# Patient Record
Sex: FEMALE | Race: BLACK OR AFRICAN AMERICAN | NOT HISPANIC OR LATINO | Employment: UNEMPLOYED | URBAN - METROPOLITAN AREA
[De-identification: names, ages, dates, MRNs, and addresses within clinical notes are randomized per-mention and may not be internally consistent; named-entity substitution may affect disease eponyms.]

---

## 2023-12-28 ENCOUNTER — ANESTHESIA (OUTPATIENT)
Dept: SURGERY | Facility: HOSPITAL | Age: 23
DRG: 958 | End: 2023-12-28
Payer: COMMERCIAL

## 2023-12-28 ENCOUNTER — ANESTHESIA EVENT (OUTPATIENT)
Dept: SURGERY | Facility: HOSPITAL | Age: 23
DRG: 958 | End: 2023-12-28
Payer: MEDICAID

## 2023-12-28 ENCOUNTER — ANESTHESIA EVENT (OUTPATIENT)
Dept: SURGERY | Facility: HOSPITAL | Age: 23
DRG: 958 | End: 2023-12-28
Payer: COMMERCIAL

## 2023-12-28 ENCOUNTER — HOSPITAL ENCOUNTER (INPATIENT)
Facility: HOSPITAL | Age: 23
LOS: 8 days | Discharge: NURSING, REHAB OR LONG-TERM CARE | DRG: 958 | End: 2024-01-05
Attending: EMERGENCY MEDICINE | Admitting: SURGERY
Payer: MEDICAID

## 2023-12-28 DIAGNOSIS — T14.8XXA HEMATOMA: ICD-10-CM

## 2023-12-28 DIAGNOSIS — S32.810A MULTIPLE CLOSED FRACTURES OF PELVIS WITH DISRUPTION OF PELVIC RING, INITIAL ENCOUNTER: ICD-10-CM

## 2023-12-28 DIAGNOSIS — S82.201B TYPE I OR II OPEN FRACTURE OF RIGHT TIBIA AND FIBULA, INITIAL ENCOUNTER: ICD-10-CM

## 2023-12-28 DIAGNOSIS — S22.41XA CLOSED TRAUMATIC DISPLACED FRACTURE OF MULTIPLE RIBS OF RIGHT SIDE: Primary | ICD-10-CM

## 2023-12-28 DIAGNOSIS — S32.591A CLOSED BILATERAL FRACTURE OF PUBIC RAMI, INITIAL ENCOUNTER: ICD-10-CM

## 2023-12-28 DIAGNOSIS — S82.401B TYPE I OR II OPEN FRACTURE OF RIGHT TIBIA AND FIBULA, INITIAL ENCOUNTER: ICD-10-CM

## 2023-12-28 DIAGNOSIS — S31.821A: ICD-10-CM

## 2023-12-28 DIAGNOSIS — M25.572 ACUTE LEFT ANKLE PAIN: ICD-10-CM
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
## 2023-12-28 DIAGNOSIS — S22.008A CLOSED FRACTURE OF SPINOUS PROCESS OF THORACIC VERTEBRA, INITIAL ENCOUNTER: ICD-10-CM

## 2023-12-28 DIAGNOSIS — R06.02 SOB (SHORTNESS OF BREATH): ICD-10-CM

## 2023-12-28 DIAGNOSIS — S27.321A CONTUSION OF RIGHT LUNG, INITIAL ENCOUNTER: ICD-10-CM

## 2023-12-28 DIAGNOSIS — N94.89 PELVIC HEMATOMA IN FEMALE: ICD-10-CM

## 2023-12-28 DIAGNOSIS — S32.592A CLOSED BILATERAL FRACTURE OF PUBIC RAMI, INITIAL ENCOUNTER: ICD-10-CM

## 2023-12-28 DIAGNOSIS — S32.009A CLOSED FRACTURE OF SPINOUS PROCESS OF LUMBAR VERTEBRA, INITIAL ENCOUNTER: ICD-10-CM

## 2023-12-28 DIAGNOSIS — M79.89 LEG SWELLING: ICD-10-CM

## 2023-12-28 DIAGNOSIS — S82.401B OPEN FRACTURE OF RIGHT TIBIA AND FIBULA: ICD-10-CM

## 2023-12-28 DIAGNOSIS — S32.401A CLOSED NONDISPLACED FRACTURE OF RIGHT ACETABULUM, UNSPECIFIED PORTION OF ACETABULUM, INITIAL ENCOUNTER: ICD-10-CM

## 2023-12-28 DIAGNOSIS — S82.201B OPEN FRACTURE OF RIGHT TIBIA AND FIBULA: ICD-10-CM

## 2023-12-28 DIAGNOSIS — S32.009A CLOSED FRACTURE OF TRANSVERSE PROCESS OF LUMBAR VERTEBRA, INITIAL ENCOUNTER: ICD-10-CM

## 2023-12-28 DIAGNOSIS — R00.0 TACHYCARDIA WITH HEART RATE 121-140 BEATS PER MINUTE: ICD-10-CM

## 2023-12-28 LAB
ABORH RETYPE: NORMAL
ABS NEUT (OLG): 10.04 X10(3)/MCL (ref 2.1–9.2)
ABS NEUT (OLG): 8.14 X10(3)/MCL (ref 2.1–9.2)
ABS NEUT (OLG): 8.24 X10(3)/MCL (ref 2.1–9.2)
ALBUMIN SERPL-MCNC: 3.6 G/DL (ref 3.5–5)
ALBUMIN/GLOB SERPL: 1.5 RATIO (ref 1.1–2)
ALP SERPL-CCNC: 56 UNIT/L (ref 40–150)
ALT SERPL-CCNC: 63 UNIT/L (ref 0–55)
ANISOCYTOSIS BLD QL SMEAR: ABNORMAL
AST SERPL-CCNC: 136 UNIT/L (ref 5–34)
B-HCG SERPL QL: NEGATIVE
BASOPHILS # BLD AUTO: 0.03 X10(3)/MCL
BASOPHILS NFR BLD AUTO: 0.3 %
BILIRUB SERPL-MCNC: 0.2 MG/DL
BUN SERPL-MCNC: 14.3 MG/DL (ref 7–18.7)
BURR CELLS (OLG): ABNORMAL
CALCIUM SERPL-MCNC: 8.4 MG/DL (ref 8.4–10.2)
CHLORIDE SERPL-SCNC: 109 MMOL/L (ref 98–107)
CO2 SERPL-SCNC: 19 MMOL/L (ref 22–29)
CREAT SERPL-MCNC: 0.8 MG/DL (ref 0.55–1.02)
EOSINOPHIL # BLD AUTO: 0 X10(3)/MCL (ref 0–0.9)
EOSINOPHIL NFR BLD AUTO: 0 %
ERYTHROCYTE [DISTWIDTH] IN BLOOD BY AUTOMATED COUNT: 15.5 % (ref 11.5–17)
ERYTHROCYTE [DISTWIDTH] IN BLOOD BY AUTOMATED COUNT: 15.6 % (ref 11.5–17)
ERYTHROCYTE [DISTWIDTH] IN BLOOD BY AUTOMATED COUNT: 15.7 % (ref 11.5–17)
ERYTHROCYTE [DISTWIDTH] IN BLOOD BY AUTOMATED COUNT: 15.7 % (ref 11.5–17)
GFR SERPLBLD CREATININE-BSD FMLA CKD-EPI: >60 MLS/MIN/1.73/M2
GLOBULIN SER-MCNC: 2.4 GM/DL (ref 2.4–3.5)
GLUCOSE SERPL-MCNC: 179 MG/DL (ref 74–100)
GROUP & RH: NORMAL
HCT VFR BLD AUTO: 22.5 % (ref 37–47)
HCT VFR BLD AUTO: 25.6 % (ref 37–47)
HCT VFR BLD AUTO: 31.2 % (ref 37–47)
HCT VFR BLD AUTO: 31.9 % (ref 37–47)
HGB BLD-MCNC: 10 G/DL (ref 12–16)
HGB BLD-MCNC: 7.1 G/DL (ref 12–16)
HGB BLD-MCNC: 8.2 G/DL (ref 12–16)
HGB BLD-MCNC: 9.7 G/DL (ref 12–16)
IMM GRANULOCYTES # BLD AUTO: 0.1 X10(3)/MCL (ref 0–0.04)
IMM GRANULOCYTES NFR BLD AUTO: 0.9 %
INDIRECT COOMBS: NORMAL
INSTRUMENT WBC (OLG): 12.55 X10(3)/MCL
INSTRUMENT WBC (OLG): 9.16 X10(3)/MCL
INSTRUMENT WBC (OLG): 9.58 X10(3)/MCL
LACTATE SERPL-SCNC: 1.6 MMOL/L (ref 0.5–2.2)
LACTATE SERPL-SCNC: 2.7 MMOL/L (ref 0.5–2.2)
LACTATE SERPL-SCNC: 2.7 MMOL/L (ref 0.5–2.2)
LACTATE SERPL-SCNC: 3.8 MMOL/L (ref 0.5–2.2)
LACTATE SERPL-SCNC: 3.9 MMOL/L (ref 0.5–2.2)
LACTATE SERPL-SCNC: 5 MMOL/L (ref 0.5–2.2)
LYMPHOCYTES # BLD AUTO: 0.9 X10(3)/MCL (ref 0.6–4.6)
LYMPHOCYTES NFR BLD AUTO: 8 %
LYMPHOCYTES NFR BLD MANUAL: 0.46 X10(3)/MCL
LYMPHOCYTES NFR BLD MANUAL: 1.25 X10(3)/MCL
LYMPHOCYTES NFR BLD MANUAL: 1.63 X10(3)/MCL
LYMPHOCYTES NFR BLD MANUAL: 13 %
LYMPHOCYTES NFR BLD MANUAL: 13 %
LYMPHOCYTES NFR BLD MANUAL: 5 %
MACROCYTES BLD QL SMEAR: ABNORMAL
MCH RBC QN AUTO: 27.3 PG (ref 27–31)
MCH RBC QN AUTO: 27.4 PG (ref 27–31)
MCH RBC QN AUTO: 27.5 PG (ref 27–31)
MCH RBC QN AUTO: 28.1 PG (ref 27–31)
MCHC RBC AUTO-ENTMCNC: 31.1 G/DL (ref 33–36)
MCHC RBC AUTO-ENTMCNC: 31.3 G/DL (ref 33–36)
MCHC RBC AUTO-ENTMCNC: 31.6 G/DL (ref 33–36)
MCHC RBC AUTO-ENTMCNC: 32 G/DL (ref 33–36)
MCV RBC AUTO: 86.9 FL (ref 80–94)
MCV RBC AUTO: 87.7 FL (ref 80–94)
MCV RBC AUTO: 87.9 FL (ref 80–94)
MCV RBC AUTO: 87.9 FL (ref 80–94)
METAMYELOCYTES NFR BLD MANUAL: 1 %
MONOCYTES # BLD AUTO: 1.3 X10(3)/MCL (ref 0.1–1.3)
MONOCYTES NFR BLD AUTO: 11.6 %
MONOCYTES NFR BLD MANUAL: 0.19 X10(3)/MCL (ref 0.1–1.3)
MONOCYTES NFR BLD MANUAL: 0.37 X10(3)/MCL (ref 0.1–1.3)
MONOCYTES NFR BLD MANUAL: 0.75 X10(3)/MCL (ref 0.1–1.3)
MONOCYTES NFR BLD MANUAL: 2 %
MONOCYTES NFR BLD MANUAL: 4 %
MONOCYTES NFR BLD MANUAL: 6 %
MYELOCYTES NFR BLD MANUAL: 1 %
NEUTROPHILS # BLD AUTO: 8.9 X10(3)/MCL (ref 2.1–9.2)
NEUTROPHILS NFR BLD AUTO: 79.2 %
NEUTROPHILS NFR BLD MANUAL: 80 %
NEUTROPHILS NFR BLD MANUAL: 85 %
NEUTROPHILS NFR BLD MANUAL: 90 %
NRBC BLD AUTO-RTO: 0 %
OVALOCYTES (OLG): ABNORMAL
PLATELET # BLD AUTO: 180 X10(3)/MCL (ref 130–400)
PLATELET # BLD AUTO: 180 X10(3)/MCL (ref 130–400)
PLATELET # BLD AUTO: 236 X10(3)/MCL (ref 130–400)
PLATELET # BLD AUTO: 298 X10(3)/MCL (ref 130–400)
PLATELET # BLD EST: ADEQUATE 10*3/UL
PLATELET # BLD EST: ADEQUATE 10*3/UL
PLATELET # BLD EST: NORMAL 10*3/UL
PMV BLD AUTO: 10.5 FL (ref 7.4–10.4)
PMV BLD AUTO: 10.7 FL (ref 7.4–10.4)
PMV BLD AUTO: 10.7 FL (ref 7.4–10.4)
PMV BLD AUTO: 10.8 FL (ref 7.4–10.4)
POIKILOCYTOSIS BLD QL SMEAR: ABNORMAL
POIKILOCYTOSIS BLD QL SMEAR: ABNORMAL
POTASSIUM SERPL-SCNC: 4.6 MMOL/L (ref 3.5–5.1)
PROT SERPL-MCNC: 6 GM/DL (ref 6.4–8.3)
RBC # BLD AUTO: 2.59 X10(6)/MCL (ref 4.2–5.4)
RBC # BLD AUTO: 2.92 X10(6)/MCL (ref 4.2–5.4)
RBC # BLD AUTO: 3.55 X10(6)/MCL (ref 4.2–5.4)
RBC # BLD AUTO: 3.63 X10(6)/MCL (ref 4.2–5.4)
RBC MORPH BLD: ABNORMAL
SODIUM SERPL-SCNC: 137 MMOL/L (ref 136–145)
SPECIMEN OUTDATE: NORMAL
STOMATOCYTES (OLG): ABNORMAL
WBC # SPEC AUTO: 11.23 X10(3)/MCL (ref 4.5–11.5)
WBC # SPEC AUTO: 12.55 X10(3)/MCL (ref 4.5–11.5)
WBC # SPEC AUTO: 9.16 X10(3)/MCL (ref 4.5–11.5)
WBC # SPEC AUTO: 9.58 X10(3)/MCL (ref 4.5–11.5)

## 2023-12-28 PROCEDURE — 80053 COMPREHEN METABOLIC PANEL: CPT | Performed by: EMERGENCY MEDICINE

## 2023-12-28 PROCEDURE — 27759 TREATMENT OF TIBIA FRACTURE: CPT | Mod: RT,,, | Performed by: STUDENT IN AN ORGANIZED HEALTH CARE EDUCATION/TRAINING PROGRAM

## 2023-12-28 PROCEDURE — 25000003 PHARM REV CODE 250

## 2023-12-28 PROCEDURE — P9016 RBC LEUKOCYTES REDUCED: HCPCS

## 2023-12-28 PROCEDURE — 25000003 PHARM REV CODE 250: Performed by: NURSE ANESTHETIST, CERTIFIED REGISTERED

## 2023-12-28 PROCEDURE — 27759 TREATMENT OF TIBIA FRACTURE: CPT | Mod: AS,RT,,

## 2023-12-28 PROCEDURE — 27201423 OPTIME MED/SURG SUP & DEVICES STERILE SUPPLY: Performed by: STUDENT IN AN ORGANIZED HEALTH CARE EDUCATION/TRAINING PROGRAM

## 2023-12-28 PROCEDURE — 63600175 PHARM REV CODE 636 W HCPCS: Performed by: STUDENT IN AN ORGANIZED HEALTH CARE EDUCATION/TRAINING PROGRAM

## 2023-12-28 PROCEDURE — 71000033 HC RECOVERY, INTIAL HOUR: Performed by: STUDENT IN AN ORGANIZED HEALTH CARE EDUCATION/TRAINING PROGRAM

## 2023-12-28 PROCEDURE — 63600175 PHARM REV CODE 636 W HCPCS: Performed by: ANESTHESIOLOGY

## 2023-12-28 PROCEDURE — 36000711: Performed by: STUDENT IN AN ORGANIZED HEALTH CARE EDUCATION/TRAINING PROGRAM

## 2023-12-28 PROCEDURE — 37000008 HC ANESTHESIA 1ST 15 MINUTES: Performed by: STUDENT IN AN ORGANIZED HEALTH CARE EDUCATION/TRAINING PROGRAM

## 2023-12-28 PROCEDURE — 30233N1 TRANSFUSION OF NONAUTOLOGOUS RED BLOOD CELLS INTO PERIPHERAL VEIN, PERCUTANEOUS APPROACH: ICD-10-PCS | Performed by: SURGERY

## 2023-12-28 PROCEDURE — 99285 EMERGENCY DEPT VISIT HI MDM: CPT | Mod: 25

## 2023-12-28 PROCEDURE — 12002 RPR S/N/AX/GEN/TRNK2.6-7.5CM: CPT

## 2023-12-28 PROCEDURE — 11000001 HC ACUTE MED/SURG PRIVATE ROOM

## 2023-12-28 PROCEDURE — 37000009 HC ANESTHESIA EA ADD 15 MINS: Performed by: STUDENT IN AN ORGANIZED HEALTH CARE EDUCATION/TRAINING PROGRAM

## 2023-12-28 PROCEDURE — 63600175 PHARM REV CODE 636 W HCPCS: Performed by: EMERGENCY MEDICINE

## 2023-12-28 PROCEDURE — 85007 BL SMEAR W/DIFF WBC COUNT: CPT

## 2023-12-28 PROCEDURE — 96375 TX/PRO/DX INJ NEW DRUG ADDON: CPT

## 2023-12-28 PROCEDURE — 86901 BLOOD TYPING SEROLOGIC RH(D): CPT | Performed by: EMERGENCY MEDICINE

## 2023-12-28 PROCEDURE — 83605 ASSAY OF LACTIC ACID: CPT

## 2023-12-28 PROCEDURE — 85025 COMPLETE CBC W/AUTO DIFF WBC: CPT | Performed by: EMERGENCY MEDICINE

## 2023-12-28 PROCEDURE — 25500020 PHARM REV CODE 255: Performed by: SURGERY

## 2023-12-28 PROCEDURE — 96365 THER/PROPH/DIAG IV INF INIT: CPT

## 2023-12-28 PROCEDURE — D9220A PRA ANESTHESIA: Mod: ANES,,, | Performed by: ANESTHESIOLOGY

## 2023-12-28 PROCEDURE — 63600175 PHARM REV CODE 636 W HCPCS: Performed by: NURSE ANESTHETIST, CERTIFIED REGISTERED

## 2023-12-28 PROCEDURE — 86923 COMPATIBILITY TEST ELECTRIC: CPT | Mod: 91

## 2023-12-28 PROCEDURE — D9220A PRA ANESTHESIA: Mod: CRNA,,, | Performed by: NURSE ANESTHETIST, CERTIFIED REGISTERED

## 2023-12-28 PROCEDURE — 84703 CHORIONIC GONADOTROPIN ASSAY: CPT | Performed by: EMERGENCY MEDICINE

## 2023-12-28 PROCEDURE — C1769 GUIDE WIRE: HCPCS | Performed by: STUDENT IN AN ORGANIZED HEALTH CARE EDUCATION/TRAINING PROGRAM

## 2023-12-28 PROCEDURE — D9220A PRA ANESTHESIA: ICD-10-PCS | Mod: ANES,,, | Performed by: ANESTHESIOLOGY

## 2023-12-28 PROCEDURE — 0QBG0ZZ EXCISION OF RIGHT TIBIA, OPEN APPROACH: ICD-10-PCS | Performed by: STUDENT IN AN ORGANIZED HEALTH CARE EDUCATION/TRAINING PROGRAM

## 2023-12-28 PROCEDURE — 0QSG06Z REPOSITION RIGHT TIBIA WITH INTRAMEDULLARY INTERNAL FIXATION DEVICE, OPEN APPROACH: ICD-10-PCS | Performed by: STUDENT IN AN ORGANIZED HEALTH CARE EDUCATION/TRAINING PROGRAM

## 2023-12-28 PROCEDURE — 96361 HYDRATE IV INFUSION ADD-ON: CPT

## 2023-12-28 PROCEDURE — 83605 ASSAY OF LACTIC ACID: CPT | Performed by: EMERGENCY MEDICINE

## 2023-12-28 PROCEDURE — 25000003 PHARM REV CODE 250: Performed by: SURGERY

## 2023-12-28 PROCEDURE — C1713 ANCHOR/SCREW BN/BN,TIS/BN: HCPCS | Performed by: STUDENT IN AN ORGANIZED HEALTH CARE EDUCATION/TRAINING PROGRAM

## 2023-12-28 PROCEDURE — 11012 DEB SKIN BONE AT FX SITE: CPT | Mod: 51,,, | Performed by: STUDENT IN AN ORGANIZED HEALTH CARE EDUCATION/TRAINING PROGRAM

## 2023-12-28 PROCEDURE — 36000710: Performed by: STUDENT IN AN ORGANIZED HEALTH CARE EDUCATION/TRAINING PROGRAM

## 2023-12-28 PROCEDURE — 85027 COMPLETE CBC AUTOMATED: CPT

## 2023-12-28 PROCEDURE — 25000003 PHARM REV CODE 250: Performed by: EMERGENCY MEDICINE

## 2023-12-28 PROCEDURE — 99223 1ST HOSP IP/OBS HIGH 75: CPT | Mod: 57,,, | Performed by: STUDENT IN AN ORGANIZED HEALTH CARE EDUCATION/TRAINING PROGRAM

## 2023-12-28 PROCEDURE — D9220A PRA ANESTHESIA: ICD-10-PCS | Mod: CRNA,,, | Performed by: NURSE ANESTHETIST, CERTIFIED REGISTERED

## 2023-12-28 PROCEDURE — 63600175 PHARM REV CODE 636 W HCPCS

## 2023-12-28 DEVICE — SCREW XL25 IM NAIL 40X5MM: Type: IMPLANTABLE DEVICE | Site: TIBIA | Status: FUNCTIONAL

## 2023-12-28 DEVICE — SCREW LP LOCKING XL25 30MM: Type: IMPLANTABLE DEVICE | Site: TIBIA | Status: FUNCTIONAL

## 2023-12-28 DEVICE — IMPLANTABLE DEVICE: Type: IMPLANTABLE DEVICE | Site: TIBIA | Status: FUNCTIONAL

## 2023-12-28 DEVICE — SCREW LOK IM NAIL XL25 50MM: Type: IMPLANTABLE DEVICE | Site: TIBIA | Status: FUNCTIONAL

## 2023-12-28 DEVICE — SCREW XL25 IM LOCK 32X5MM: Type: IMPLANTABLE DEVICE | Site: TIBIA | Status: FUNCTIONAL

## 2023-12-28 RX ORDER — CEFAZOLIN SODIUM 1 G/3ML
INJECTION, POWDER, FOR SOLUTION INTRAMUSCULAR; INTRAVENOUS
Status: DISCONTINUED | OUTPATIENT
Start: 2023-12-28 | End: 2023-12-28

## 2023-12-28 RX ORDER — FENTANYL CITRATE 50 UG/ML
25 INJECTION, SOLUTION INTRAMUSCULAR; INTRAVENOUS ONCE
Status: COMPLETED | OUTPATIENT
Start: 2023-12-28 | End: 2023-12-28

## 2023-12-28 RX ORDER — ACETAMINOPHEN 325 MG/1
650 TABLET ORAL EVERY 4 HOURS
Status: DISPENSED | OUTPATIENT
Start: 2023-12-28 | End: 2024-01-02

## 2023-12-28 RX ORDER — HYDROMORPHONE HYDROCHLORIDE 2 MG/ML
0.4 INJECTION, SOLUTION INTRAMUSCULAR; INTRAVENOUS; SUBCUTANEOUS EVERY 5 MIN PRN
Status: DISCONTINUED | OUTPATIENT
Start: 2023-12-28 | End: 2023-12-28 | Stop reason: HOSPADM

## 2023-12-28 RX ORDER — ONDANSETRON 2 MG/ML
4 INJECTION INTRAMUSCULAR; INTRAVENOUS DAILY PRN
Status: DISCONTINUED | OUTPATIENT
Start: 2023-12-28 | End: 2023-12-28 | Stop reason: HOSPADM

## 2023-12-28 RX ORDER — DEXAMETHASONE SODIUM PHOSPHATE 4 MG/ML
INJECTION, SOLUTION INTRA-ARTICULAR; INTRALESIONAL; INTRAMUSCULAR; INTRAVENOUS; SOFT TISSUE
Status: DISCONTINUED | OUTPATIENT
Start: 2023-12-28 | End: 2023-12-28

## 2023-12-28 RX ORDER — VANCOMYCIN HYDROCHLORIDE 1 G/20ML
INJECTION, POWDER, LYOPHILIZED, FOR SOLUTION INTRAVENOUS
Status: DISCONTINUED | OUTPATIENT
Start: 2023-12-28 | End: 2023-12-28 | Stop reason: HOSPADM

## 2023-12-28 RX ORDER — MUPIROCIN 20 MG/G
OINTMENT TOPICAL 2 TIMES DAILY
Status: DISPENSED | OUTPATIENT
Start: 2023-12-28 | End: 2024-01-02

## 2023-12-28 RX ORDER — POLYETHYLENE GLYCOL 3350 17 G/17G
17 POWDER, FOR SOLUTION ORAL 2 TIMES DAILY
Status: DISCONTINUED | OUTPATIENT
Start: 2023-12-28 | End: 2024-01-05 | Stop reason: HOSPADM

## 2023-12-28 RX ORDER — DOCUSATE SODIUM 100 MG/1
100 CAPSULE, LIQUID FILLED ORAL 2 TIMES DAILY
Status: DISCONTINUED | OUTPATIENT
Start: 2023-12-28 | End: 2024-01-05 | Stop reason: HOSPADM

## 2023-12-28 RX ORDER — METHOCARBAMOL 500 MG/1
500 TABLET, FILM COATED ORAL EVERY 8 HOURS
Status: DISCONTINUED | OUTPATIENT
Start: 2023-12-28 | End: 2024-01-01

## 2023-12-28 RX ORDER — ADHESIVE BANDAGE
30 BANDAGE TOPICAL DAILY PRN
Status: DISCONTINUED | OUTPATIENT
Start: 2023-12-28 | End: 2024-01-05 | Stop reason: HOSPADM

## 2023-12-28 RX ORDER — TALC
6 POWDER (GRAM) TOPICAL NIGHTLY PRN
Status: DISCONTINUED | OUTPATIENT
Start: 2023-12-28 | End: 2024-01-05 | Stop reason: HOSPADM

## 2023-12-28 RX ORDER — PROCHLORPERAZINE EDISYLATE 5 MG/ML
5 INJECTION INTRAMUSCULAR; INTRAVENOUS EVERY 30 MIN PRN
Status: DISCONTINUED | OUTPATIENT
Start: 2023-12-28 | End: 2023-12-28 | Stop reason: HOSPADM

## 2023-12-28 RX ORDER — TRAZODONE HYDROCHLORIDE 50 MG/1
50 TABLET ORAL NIGHTLY
Status: DISCONTINUED | OUTPATIENT
Start: 2023-12-28 | End: 2024-01-05 | Stop reason: HOSPADM

## 2023-12-28 RX ORDER — OXYCODONE HYDROCHLORIDE 5 MG/1
5 TABLET ORAL EVERY 4 HOURS PRN
Status: DISCONTINUED | OUTPATIENT
Start: 2023-12-28 | End: 2024-01-05 | Stop reason: HOSPADM

## 2023-12-28 RX ORDER — OXYCODONE HYDROCHLORIDE 10 MG/1
10 TABLET ORAL EVERY 4 HOURS PRN
Status: DISCONTINUED | OUTPATIENT
Start: 2023-12-28 | End: 2024-01-05 | Stop reason: HOSPADM

## 2023-12-28 RX ORDER — SODIUM CHLORIDE 9 MG/ML
1000 INJECTION, SOLUTION INTRAVENOUS
Status: COMPLETED | OUTPATIENT
Start: 2023-12-28 | End: 2023-12-28

## 2023-12-28 RX ORDER — PHENYLEPHRINE HYDROCHLORIDE 10 MG/ML
INJECTION INTRAVENOUS
Status: DISCONTINUED | OUTPATIENT
Start: 2023-12-28 | End: 2023-12-28

## 2023-12-28 RX ORDER — BACITRACIN 500 [USP'U]/G
OINTMENT TOPICAL 3 TIMES DAILY
Status: DISCONTINUED | OUTPATIENT
Start: 2023-12-28 | End: 2024-01-05 | Stop reason: HOSPADM

## 2023-12-28 RX ORDER — PROPOFOL 10 MG/ML
VIAL (ML) INTRAVENOUS
Status: DISCONTINUED | OUTPATIENT
Start: 2023-12-28 | End: 2023-12-28

## 2023-12-28 RX ORDER — FENTANYL CITRATE 50 UG/ML
INJECTION, SOLUTION INTRAMUSCULAR; INTRAVENOUS
Status: DISCONTINUED | OUTPATIENT
Start: 2023-12-28 | End: 2023-12-28

## 2023-12-28 RX ORDER — DIVALPROEX SODIUM 500 MG/1
500 TABLET, FILM COATED, EXTENDED RELEASE ORAL 2 TIMES DAILY
Status: DISCONTINUED | OUTPATIENT
Start: 2023-12-28 | End: 2024-01-05 | Stop reason: HOSPADM

## 2023-12-28 RX ORDER — LIDOCAINE HYDROCHLORIDE 10 MG/ML
INJECTION INFILTRATION; PERINEURAL
Status: COMPLETED
Start: 2023-12-28 | End: 2023-12-28

## 2023-12-28 RX ORDER — ROCURONIUM BROMIDE 10 MG/ML
INJECTION, SOLUTION INTRAVENOUS
Status: DISCONTINUED | OUTPATIENT
Start: 2023-12-28 | End: 2023-12-28

## 2023-12-28 RX ORDER — GABAPENTIN 300 MG/1
300 CAPSULE ORAL 3 TIMES DAILY
Status: DISCONTINUED | OUTPATIENT
Start: 2023-12-28 | End: 2024-01-01

## 2023-12-28 RX ORDER — CEFAZOLIN SODIUM 2 G/50ML
2 SOLUTION INTRAVENOUS
Status: COMPLETED | OUTPATIENT
Start: 2023-12-28 | End: 2023-12-29

## 2023-12-28 RX ORDER — HYDROCODONE BITARTRATE AND ACETAMINOPHEN 500; 5 MG/1; MG/1
TABLET ORAL
Status: DISCONTINUED | OUTPATIENT
Start: 2023-12-29 | End: 2024-01-05 | Stop reason: HOSPADM

## 2023-12-28 RX ORDER — ONDANSETRON 2 MG/ML
4 INJECTION INTRAMUSCULAR; INTRAVENOUS
Status: COMPLETED | OUTPATIENT
Start: 2023-12-28 | End: 2023-12-28

## 2023-12-28 RX ORDER — DIVALPROEX SODIUM 500 MG/1
500 TABLET, FILM COATED, EXTENDED RELEASE ORAL 2 TIMES DAILY
Status: ON HOLD | COMMUNITY
End: 2024-01-05

## 2023-12-28 RX ORDER — METHOCARBAMOL 100 MG/ML
1000 INJECTION, SOLUTION INTRAMUSCULAR; INTRAVENOUS ONCE
Status: DISCONTINUED | OUTPATIENT
Start: 2023-12-28 | End: 2023-12-28 | Stop reason: HOSPADM

## 2023-12-28 RX ORDER — LIDOCAINE HYDROCHLORIDE 20 MG/ML
INJECTION, SOLUTION EPIDURAL; INFILTRATION; INTRACAUDAL; PERINEURAL
Status: DISCONTINUED | OUTPATIENT
Start: 2023-12-28 | End: 2023-12-28

## 2023-12-28 RX ORDER — TRAZODONE HYDROCHLORIDE 50 MG/1
50 TABLET ORAL NIGHTLY
Status: ON HOLD | COMMUNITY
End: 2024-01-05

## 2023-12-28 RX ORDER — LORAZEPAM 1 MG/1
1 TABLET ORAL EVERY 4 HOURS PRN
Status: DISCONTINUED | OUTPATIENT
Start: 2023-12-28 | End: 2024-01-05 | Stop reason: HOSPADM

## 2023-12-28 RX ORDER — HYDROMORPHONE HYDROCHLORIDE 2 MG/ML
1 INJECTION, SOLUTION INTRAMUSCULAR; INTRAVENOUS; SUBCUTANEOUS
Status: COMPLETED | OUTPATIENT
Start: 2023-12-28 | End: 2023-12-28

## 2023-12-28 RX ORDER — MEPERIDINE HYDROCHLORIDE 25 MG/ML
12.5 INJECTION INTRAMUSCULAR; INTRAVENOUS; SUBCUTANEOUS EVERY 10 MIN PRN
Status: DISCONTINUED | OUTPATIENT
Start: 2023-12-28 | End: 2023-12-28 | Stop reason: HOSPADM

## 2023-12-28 RX ORDER — ONDANSETRON 2 MG/ML
INJECTION INTRAMUSCULAR; INTRAVENOUS
Status: DISCONTINUED | OUTPATIENT
Start: 2023-12-28 | End: 2023-12-28

## 2023-12-28 RX ORDER — HYDROXYZINE PAMOATE 25 MG/1
25 CAPSULE ORAL EVERY 6 HOURS PRN
Status: ON HOLD | COMMUNITY
End: 2024-01-05

## 2023-12-28 RX ORDER — GLYCOPYRROLATE 0.2 MG/ML
INJECTION INTRAMUSCULAR; INTRAVENOUS
Status: DISCONTINUED | OUTPATIENT
Start: 2023-12-28 | End: 2023-12-28

## 2023-12-28 RX ORDER — FENTANYL CITRATE 50 UG/ML
INJECTION, SOLUTION INTRAMUSCULAR; INTRAVENOUS
Status: COMPLETED
Start: 2023-12-28 | End: 2023-12-28

## 2023-12-28 RX ORDER — IPRATROPIUM BROMIDE AND ALBUTEROL SULFATE 2.5; .5 MG/3ML; MG/3ML
3 SOLUTION RESPIRATORY (INHALATION) ONCE AS NEEDED
Status: DISCONTINUED | OUTPATIENT
Start: 2023-12-28 | End: 2023-12-28 | Stop reason: HOSPADM

## 2023-12-28 RX ORDER — HYDROXYZINE PAMOATE 25 MG/1
25 CAPSULE ORAL EVERY 6 HOURS PRN
Status: DISCONTINUED | OUTPATIENT
Start: 2023-12-28 | End: 2024-01-05 | Stop reason: HOSPADM

## 2023-12-28 RX ORDER — CEFAZOLIN SODIUM 2 G/50ML
2 SOLUTION INTRAVENOUS
Status: DISCONTINUED | OUTPATIENT
Start: 2023-12-28 | End: 2023-12-29

## 2023-12-28 RX ORDER — SODIUM CHLORIDE 9 MG/ML
INJECTION, SOLUTION INTRAVENOUS CONTINUOUS
Status: DISCONTINUED | OUTPATIENT
Start: 2023-12-28 | End: 2024-01-03

## 2023-12-28 RX ORDER — MIDAZOLAM HYDROCHLORIDE 1 MG/ML
INJECTION INTRAMUSCULAR; INTRAVENOUS
Status: DISCONTINUED | OUTPATIENT
Start: 2023-12-28 | End: 2023-12-28

## 2023-12-28 RX ORDER — ACETAMINOPHEN 10 MG/ML
INJECTION, SOLUTION INTRAVENOUS
Status: DISCONTINUED | OUTPATIENT
Start: 2023-12-28 | End: 2023-12-28

## 2023-12-28 RX ORDER — HALOPERIDOL 5 MG/1
5 TABLET ORAL 2 TIMES DAILY
Status: ON HOLD | COMMUNITY
End: 2023-12-30

## 2023-12-28 RX ORDER — SODIUM CHLORIDE 0.9 % (FLUSH) 0.9 %
10 SYRINGE (ML) INJECTION
Status: DISCONTINUED | OUTPATIENT
Start: 2023-12-28 | End: 2023-12-28 | Stop reason: HOSPADM

## 2023-12-28 RX ADMIN — LIDOCAINE HYDROCHLORIDE: 10 INJECTION, SOLUTION INFILTRATION; PERINEURAL at 05:12

## 2023-12-28 RX ADMIN — CEFAZOLIN SODIUM 2 G: 2 SOLUTION INTRAVENOUS at 08:12

## 2023-12-28 RX ADMIN — GABAPENTIN 300 MG: 300 CAPSULE ORAL at 09:12

## 2023-12-28 RX ADMIN — OXYCODONE HYDROCHLORIDE 10 MG: 10 TABLET ORAL at 09:12

## 2023-12-28 RX ADMIN — SODIUM CHLORIDE: 9 INJECTION, SOLUTION INTRAVENOUS at 08:12

## 2023-12-28 RX ADMIN — ROCURONIUM BROMIDE 20 MG: 10 SOLUTION INTRAVENOUS at 02:12

## 2023-12-28 RX ADMIN — PHENYLEPHRINE HYDROCHLORIDE 100 MCG: 10 INJECTION INTRAVENOUS at 02:12

## 2023-12-28 RX ADMIN — DOCUSATE SODIUM 100 MG: 100 CAPSULE, LIQUID FILLED ORAL at 09:12

## 2023-12-28 RX ADMIN — METHOCARBAMOL 500 MG: 500 TABLET ORAL at 09:12

## 2023-12-28 RX ADMIN — SODIUM CHLORIDE: 9 INJECTION, SOLUTION INTRAVENOUS at 05:12

## 2023-12-28 RX ADMIN — FENTANYL CITRATE 50 MCG: 50 INJECTION, SOLUTION INTRAMUSCULAR; INTRAVENOUS at 02:12

## 2023-12-28 RX ADMIN — MUPIROCIN: 20 OINTMENT TOPICAL at 09:12

## 2023-12-28 RX ADMIN — HYDROMORPHONE HYDROCHLORIDE 1 MG: 2 INJECTION, SOLUTION INTRAMUSCULAR; INTRAVENOUS; SUBCUTANEOUS at 05:12

## 2023-12-28 RX ADMIN — SODIUM CHLORIDE, SODIUM GLUCONATE, SODIUM ACETATE, POTASSIUM CHLORIDE AND MAGNESIUM CHLORIDE: 526; 502; 368; 37; 30 INJECTION, SOLUTION INTRAVENOUS at 01:12

## 2023-12-28 RX ADMIN — SODIUM CHLORIDE 1000 ML: 9 INJECTION, SOLUTION INTRAVENOUS at 04:12

## 2023-12-28 RX ADMIN — MIDAZOLAM HYDROCHLORIDE 2 MG: 1 INJECTION, SOLUTION INTRAMUSCULAR; INTRAVENOUS at 01:12

## 2023-12-28 RX ADMIN — DIATRIZOATE MEGLUMINE AND DIATRIZOATE SODIUM 30 ML: 660; 100 LIQUID ORAL; RECTAL at 09:12

## 2023-12-28 RX ADMIN — ONDANSETRON 4 MG: 2 INJECTION INTRAMUSCULAR; INTRAVENOUS at 03:12

## 2023-12-28 RX ADMIN — ACETAMINOPHEN 325MG 650 MG: 325 TABLET ORAL at 09:12

## 2023-12-28 RX ADMIN — LIDOCAINE HYDROCHLORIDE 4 ML: 20 INJECTION, SOLUTION EPIDURAL; INFILTRATION; INTRACAUDAL; PERINEURAL at 01:12

## 2023-12-28 RX ADMIN — HYDROMORPHONE HYDROCHLORIDE 0.4 MG: 2 INJECTION, SOLUTION INTRAMUSCULAR; INTRAVENOUS; SUBCUTANEOUS at 04:12

## 2023-12-28 RX ADMIN — CEFAZOLIN 2 G: 330 INJECTION, POWDER, FOR SOLUTION INTRAMUSCULAR; INTRAVENOUS at 02:12

## 2023-12-28 RX ADMIN — PROPOFOL 140 MG: 10 INJECTION, EMULSION INTRAVENOUS at 01:12

## 2023-12-28 RX ADMIN — DEXAMETHASONE SODIUM PHOSPHATE 8 MG: 4 INJECTION, SOLUTION INTRA-ARTICULAR; INTRALESIONAL; INTRAMUSCULAR; INTRAVENOUS; SOFT TISSUE at 02:12

## 2023-12-28 RX ADMIN — SODIUM CHLORIDE 500 ML: 9 INJECTION, SOLUTION INTRAVENOUS at 09:12

## 2023-12-28 RX ADMIN — DIVALPROEX SODIUM 500 MG: 500 TABLET, FILM COATED, EXTENDED RELEASE ORAL at 09:12

## 2023-12-28 RX ADMIN — POLYETHYLENE GLYCOL 3350 17 G: 17 POWDER, FOR SOLUTION ORAL at 09:12

## 2023-12-28 RX ADMIN — FENTANYL CITRATE 100 MCG: 50 INJECTION, SOLUTION INTRAMUSCULAR; INTRAVENOUS at 02:12

## 2023-12-28 RX ADMIN — GLYCOPYRROLATE 0.2 MG: 0.2 INJECTION INTRAMUSCULAR; INTRAVENOUS at 01:12

## 2023-12-28 RX ADMIN — TRAZODONE HYDROCHLORIDE 50 MG: 50 TABLET ORAL at 09:12

## 2023-12-28 RX ADMIN — SODIUM CHLORIDE 500 ML: 9 INJECTION, SOLUTION INTRAVENOUS at 07:12

## 2023-12-28 RX ADMIN — IOPAMIDOL 100 ML: 755 INJECTION, SOLUTION INTRAVENOUS at 09:12

## 2023-12-28 RX ADMIN — HYDROMORPHONE HYDROCHLORIDE 0.4 MG: 2 INJECTION, SOLUTION INTRAMUSCULAR; INTRAVENOUS; SUBCUTANEOUS at 05:12

## 2023-12-28 RX ADMIN — FENTANYL CITRATE 50 MCG: 50 INJECTION, SOLUTION INTRAMUSCULAR; INTRAVENOUS at 01:12

## 2023-12-28 RX ADMIN — ONDANSETRON 4 MG: 2 INJECTION INTRAMUSCULAR; INTRAVENOUS at 05:12

## 2023-12-28 RX ADMIN — SODIUM CHLORIDE, POTASSIUM CHLORIDE, SODIUM LACTATE AND CALCIUM CHLORIDE 1000 ML: 600; 310; 30; 20 INJECTION, SOLUTION INTRAVENOUS at 06:12

## 2023-12-28 RX ADMIN — SUGAMMADEX 185 MG: 100 INJECTION, SOLUTION INTRAVENOUS at 03:12

## 2023-12-28 RX ADMIN — ACETAMINOPHEN 1000 MG: 10 INJECTION, SOLUTION INTRAVENOUS at 04:12

## 2023-12-28 RX ADMIN — FENTANYL CITRATE 100 MCG: 50 INJECTION, SOLUTION INTRAMUSCULAR; INTRAVENOUS at 03:12

## 2023-12-28 RX ADMIN — BACITRACIN: 500 OINTMENT TOPICAL at 10:12

## 2023-12-28 RX ADMIN — CEFAZOLIN SODIUM 2 G: 2 SOLUTION INTRAVENOUS at 09:12

## 2023-12-28 RX ADMIN — ROCURONIUM BROMIDE 50 MG: 10 SOLUTION INTRAVENOUS at 01:12

## 2023-12-28 RX ADMIN — FENTANYL CITRATE 25 MCG: 50 INJECTION, SOLUTION INTRAMUSCULAR; INTRAVENOUS at 01:12

## 2023-12-28 RX ADMIN — SODIUM CHLORIDE, SODIUM GLUCONATE, SODIUM ACETATE, POTASSIUM CHLORIDE AND MAGNESIUM CHLORIDE: 526; 502; 368; 37; 30 INJECTION, SOLUTION INTRAVENOUS at 02:12

## 2023-12-28 NOTE — OP NOTE
OPERATIVE REPORT    Patient: Sandra Hernandez   : 2000    MRN: [id number]  Date: 2023      Surgeon: Obdulio Marquez MD  Assistant: Estrella Chang PA-C  who was necessary and essential for all aspects of the operation, including but not limited to patient positioning, surgical exposure, wound closure, and dressing placement.    Preoperative Diagnosis:  Right open tibial shaft fracture  Postoperative Diagnosis: Same  Procedure:  1) Treatment of tibial shaft fracture with intramedullary implant - 10426  2) excisional debridement of open fracture right tibia of skin, subcutaneous tissue, adipose tissue, muscle , bone.  CPT 60515    Anesthesiologist: No responsible provider has been recorded for the case.  OR Staff: Circulator: Chantell Gonzalez RN; Simon Maynard RN  Scrub Person: Yousif Cruz ST  X-Ray Technologist: Lacy Dave RT  Implants:   Implant Name Type Inv. Item Serial No.  Lot No. LRB No. Used Action   NAIL TIBIAL ADVANCED [id number] - [id number]  NAIL TIBIAL ADVANCED [id number]  [employer]  Right 1 Implanted   LOW PROF LOCKING SCREW      Right 1 Implanted   SCREW LP LOCKING XL25 30MM - [id number]  SCREW LP LOCKING XL25 30MM  [employer]  Right 1 Implanted   SCREW XL25 IM LOCK 32X5MM - [id number]  SCREW XL25 IM LOCK 32X5MM  [employer]  Right 1 Implanted   LOCKING SCREW FOR IM NAIL      Right 1 Implanted   SCREW ZACARIAS IM NAIL XL25 50MM - [id number]  SCREW ZACARIAS IM NAIL XL25 50MM  [employer]  Right 1 Implanted   END CAP FOR TNA / 5 MM STERILE      Right 1 Implanted   WIRE GUIDE 3.2MM 400MM - [id number]  WIRE GUIDE 3.2MM 400MM  [employer]  Right 2 Implanted and Explanted   SCREW XL25 IM NAIL 40X5MM - [id number]  SCREW XL25 IM NAIL 40X5MM  [employer]  Right 1 Implanted     EBL: See anesthesia note  Complications: None  Disposition: To PACU, stable    Indications: Sandra Hernandez is a 23 y.o. female presenting with the aforementioned injuries. The procedure is indicated to best obtain and maintain  stability of the leg while allowing early ROM.  The patient is awake and alert. After thorough discussion of the risks, benefits, expected outcomes, and alternatives to surgical intervention, the patient agreed to proceed with surgical treatment.  Specific risks discussed included, but were not limited to: superficial or deep infection, wound healing complications, DVT/PE, significant bleeding requiring transfusion, damage to named anatomic structures in the immediate area including named neurovascular structures, implant failure, and general risks of anesthesia.  The patient voiced understanding and written as well as verbal consent was obtained by myself prior to the procedure.    Procedure in Detail:  The patient's extremity was marked by me in the preoperative area.  She was taken to the operating room and after satisfactory induction of anesthesia, was positioned supine on a radiolucent table, with a soft bump under the ipsilateral hip. The lower extremity was sterilely prepped and draped in the usual fashion.  Standard time out procedure was performed confirming the correct surgeon, site, side, patient ID and procedure.      The traumatic wound was extended proximally and distally.  The tibia was identified and an excisional debridement was performed of skin, subcutaneous tissue, devitalized muscle, bone sharply with scissors, rongeur, curette.  The wound was copiously irrigated with normal saline and closed loosely with nylon suture.    A small longitudinal incision was made superior to the patella. Bovie was used for hemostasis.  The quadriceps tendon was split in-line with its fibers. Soft tissues and the patella were protected, and a guide wire was placed in the appropriate starting position.  Once this position was confirmed with  C-arm  in multiple planes, the wire was advanced into the proximal tibia. The starting reamer was then used through protected soft tissues to create the entry hole over the  guide wire.  Next, a long beaded-tip reaming wire was placed into the intramedullary canal.  C-arm images in multiple planes confirmed successful crossing of the fracture site and intramedullary location of this wire in the distal segment. Intramedullary length was measured, and the intramedullary canal was sequentially reamed to a final reamer size of 1 mm larger than the final nail. Care was taken to maintain fracture reduction during the reaming process.      A Synthes tibia nail was assembled to the jig, and inserted into the tibia over the guide wire.  C-arm imaging confirmed full insertion of the nail, with no prominence.  Fracture reduction and alignment was well maintained.  This fracture is transverse.  Therefore,  3 interlocking screws were placed proximally using the jig, and 2 interlocking screws were placed distally using free-hand perfect [place] technique.  All interlocking screws were place through small incisions, blunt dissection, and with neuro-vascular structures protected.     At this time, the ankle was evaluated fluoroscopically for instability.  With a mortise view held, an external rotation moment was placed on the ankle. There was no obvious widening of the syndesmosis or of the medial clear space.    The incisions were then irrigated using copious sterile saline and then closed in layered fashion.  The surgical sites were sterilely cleansed and dressed.    The patient was then subsequently extubated and transferred to to PACU in a stable condition.    All sponge and needle counts were correct at the end of the case.  I was present and participated in all aspects of the procedure.    Prognosis:  The patient will be kept WBAT on the ipsilateral extremity; however, she has a pelvis injury so I will defer to my trauma partner Dr. Ferreira in his far as definitive weight-bearing status.  DVT prophylaxis is indicated for this patient and procedure.  The patient is at risk of pain and stiffness and  will be allowed immediate AROM of the knee and ankle       This note/OR report was created with the assistance of  voice recognition software or phone  dictation.  There may be transcription errors as a result of using this technology however minimal. Effort has been made to assure accuracy of transcription but any obvious errors or omissions should be clarified with the author of the document.
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
## 2023-12-28 NOTE — PLAN OF CARE
Met with patient to conduct initial cm dc planning assessment and BRIEF TRAUMA INTERVENTION. Unsure of the accuracy of information provided. Pt states she is living in [place] but facesheet has a [place] address. Pt states she is  but demographics list pt as single. Pt reported being pregnant but UPT negative. No living will or POA. No PCP. Pt reported having sitters every other day from [employer] but this is listed as a rehabilitation center online. Pt reported drinking 3 days a week approximately 1 drink. Pt denied drug use. Pt denied need for substance abuse resources.    12/28/23 0929   Discharge Assessment   Assessment Type Discharge Planning Assessment   Source of Information patient   When was your last doctors appointment?   (No PCP)   Communicated VESTA with patient/caregiver Date not available/Unable to determine   People in Home alone   Do you expect to return to your current living situation? Yes   Do you have help at home or someone to help you manage your care at home? Yes   Who are your caregiver(s) and their phone number(s)? [facility]na [facility]ults [phone]. Also reports having sitters every other day   Prior to hospitilization cognitive status: Unable to Assess   Current cognitive status: Unable to Assess   Walking or Climbing Stairs Difficulty yes   Walking or Climbing Stairs ambulation difficulty, requires equipment   Mobility Management states used rollator prior to admit   Home Accessibility stairs to enter home   Number of Stairs, Main Entrance three   Home Layout Able to live on 1st floor   Equipment Currently Used at Home rollator;shower chair;bedside commode   Readmission within 30 days? No   Patient currently being followed by outpatient case management? No   Do you currently have service(s) that help you manage your care at home? Yes   Name and Contact number of agency states sitters come every other day from [facility]   Is the pt/caregiver preference to resume services with  current agency Yes   Do you take prescription medications? Yes  (Fills with [facility] on [address])   Do you have prescription coverage? No   Do you have any problems affording any of your prescribed medications? TBD   Is the patient taking medications as prescribed? yes   Who is going to help you get home at discharge? Marbella Aguillon   How do you get to doctors appointments? family or friend will provide   Are you on dialysis? No   Do you take coumadin? No   Discharge Plan A Other  (TBD)   DME Needed Upon Discharge  other (see comments)  (TBD)   Discharge Plan discussed with: Patient   Transition of Care Barriers Unisured;Social

## 2023-12-28 NOTE — INTERVAL H&P NOTE
The patient has been examined and the H&P has been reviewed:    I concur with the findings and no changes have occurred since H&P was written.    Surgery risks, benefits and alternative options discussed and understood by patient/family.          Active Hospital Problems    Diagnosis  POA    Left ankle pain [M25.572]  Unknown    Type I or II open fracture of right tibia and fibula [S82.201B, S82.401B]  Unknown      Resolved Hospital Problems   No resolved problems to display.
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
## 2023-12-28 NOTE — TRANSFER OF CARE
"Anesthesia Transfer of Care Note    Patient: Sandra Hernandez    Procedure(s) Performed: Procedure(s) (LRB):  INSERTION, INTRAMEDULLARY DELMI, TIBIA (Right)  INCISION AND DRAINAGE, LOWER EXTREMITY (Right)    Patient location: PACU    Anesthesia Type: general    Transport from OR: Transported from OR on room air with adequate spontaneous ventilation    Post pain: adequate analgesia    Post assessment: no apparent anesthetic complications    Post vital signs: stable    Level of consciousness: awake and sedated    Nausea/Vomiting: no nausea/vomiting    Complications: none    Transfer of care protocol was followed      Last vitals: Visit Vitals  /74 (BP Location: Left arm, Patient Position: Sitting)   Pulse (!) 115   Temp 36.4 °C (97.6 °F) (Oral)   Resp 12   Ht 5' 3" (1.6 m)   Wt 68 kg (150 lb)   SpO2 97%   BMI 26.57 kg/m²     "
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
## 2023-12-28 NOTE — ED NOTES
See Trauma Transfer In Report Form and Records from sending facility [facility] scanned into EMR

## 2023-12-28 NOTE — ANESTHESIA PROCEDURE NOTES
Intubation    Date/Time: 12/28/2023 1:58 PM    Performed by: Elías Richey CRNA  Authorized by: Joseph Champion MD    Intubation:     Induction:  Intravenous    Mask Ventilation:  Easy mask    Attempts:  1    Attempted By:  CRNA    Method of Intubation:  Direct    Blade:  Jones 2    Laryngeal View Grade: Grade I - full view of cords      Difficult Airway Encountered?: No      Complications:  None    Airway Device:  Oral endotracheal tube    Airway Device Size:  6.5    Style/Cuff Inflation:  Cuffed    Tube secured:  21    Secured at:  The lips    Placement Verified By:  Capnometry    Complicating Factors:  None    Findings Post-Intubation:  BS equal bilateral
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
## 2023-12-28 NOTE — ED NOTES
Attempt to place pt ascencio catheter. Pt screams loudly to stop what I was doing her legs were hurting. Educated pt ascencio was being placed due to her pelvic fx and pt will be transferred to ICU, will helo with urination and elimanation to avoid frequent movement and avoid pt from getting wet in any way with purewick. LMP is now. @ Techs noted at bedside for help with placement of ascencio cath pt legs were not moved outward. Ascencio was not placed will notify oncoming nurse. Replaced purewick on pt for elimination comfort.

## 2023-12-29 ENCOUNTER — ANESTHESIA (OUTPATIENT)
Dept: SURGERY | Facility: HOSPITAL | Age: 23
DRG: 958 | End: 2023-12-29
Payer: MEDICAID

## 2023-12-29 PROBLEM — N94.89 PELVIC HEMATOMA IN FEMALE: Status: ACTIVE | Noted: 2023-12-29

## 2023-12-29 PROBLEM — S27.321A CONTUSION OF RIGHT LUNG: Status: ACTIVE | Noted: 2023-12-29

## 2023-12-29 PROBLEM — S32.591A CLOSED BILATERAL FRACTURE OF PUBIC RAMI: Status: ACTIVE | Noted: 2023-12-29

## 2023-12-29 PROBLEM — S32.009A CLOSED FRACTURE OF SPINOUS PROCESS OF LUMBAR VERTEBRA: Status: ACTIVE | Noted: 2023-12-29

## 2023-12-29 PROBLEM — S22.008A CLOSED FRACTURE OF SPINOUS PROCESS OF THORACIC VERTEBRA: Status: ACTIVE | Noted: 2023-12-29

## 2023-12-29 PROBLEM — S32.009A CLOSED FRACTURE OF TRANSVERSE PROCESS OF LUMBAR VERTEBRA: Status: ACTIVE | Noted: 2023-12-29

## 2023-12-29 PROBLEM — S32.10XA CLOSED FRACTURE OF SACRUM: Status: ACTIVE | Noted: 2023-12-29

## 2023-12-29 PROBLEM — S32.592A CLOSED BILATERAL FRACTURE OF PUBIC RAMI: Status: ACTIVE | Noted: 2023-12-29

## 2023-12-29 PROBLEM — S31.821A: Status: ACTIVE | Noted: 2023-12-29

## 2023-12-29 LAB
ABO + RH BLD: NORMAL
ABO + RH BLD: NORMAL
ABS NEUT (OLG): 10.27 X10(3)/MCL (ref 2.1–9.2)
ABS NEUT (OLG): 7.78 X10(3)/MCL (ref 2.1–9.2)
ALBUMIN SERPL-MCNC: 2.5 G/DL (ref 3.5–5)
ALBUMIN/GLOB SERPL: 1.2 RATIO (ref 1.1–2)
ALP SERPL-CCNC: 43 UNIT/L (ref 40–150)
ALT SERPL-CCNC: 36 UNIT/L (ref 0–55)
ANISOCYTOSIS BLD QL SMEAR: ABNORMAL
AST SERPL-CCNC: 95 UNIT/L (ref 5–34)
BASOPHILS # BLD AUTO: 0.04 X10(3)/MCL
BASOPHILS NFR BLD AUTO: 0.4 %
BILIRUB SERPL-MCNC: 0.3 MG/DL
BLD PROD TYP BPU: NORMAL
BLD PROD TYP BPU: NORMAL
BLOOD UNIT EXPIRATION DATE: NORMAL
BLOOD UNIT EXPIRATION DATE: NORMAL
BLOOD UNIT TYPE CODE: 5100
BLOOD UNIT TYPE CODE: 5100
BUN SERPL-MCNC: 8.6 MG/DL (ref 7–18.7)
CALCIUM SERPL-MCNC: 7 MG/DL (ref 8.4–10.2)
CHLORIDE SERPL-SCNC: 106 MMOL/L (ref 98–107)
CO2 SERPL-SCNC: 23 MMOL/L (ref 22–29)
CREAT SERPL-MCNC: 0.57 MG/DL (ref 0.55–1.02)
CROSSMATCH INTERPRETATION: NORMAL
CROSSMATCH INTERPRETATION: NORMAL
DISPENSE STATUS: NORMAL
DISPENSE STATUS: NORMAL
EOSINOPHIL # BLD AUTO: 0.05 X10(3)/MCL (ref 0–0.9)
EOSINOPHIL NFR BLD AUTO: 0.5 %
ERYTHROCYTE [DISTWIDTH] IN BLOOD BY AUTOMATED COUNT: 15.4 % (ref 11.5–17)
ERYTHROCYTE [DISTWIDTH] IN BLOOD BY AUTOMATED COUNT: 15.7 % (ref 11.5–17)
ERYTHROCYTE [DISTWIDTH] IN BLOOD BY AUTOMATED COUNT: 15.8 % (ref 11.5–17)
GFR SERPLBLD CREATININE-BSD FMLA CKD-EPI: >60 MLS/MIN/1.73/M2
GLOBULIN SER-MCNC: 2.1 GM/DL (ref 2.4–3.5)
GLUCOSE SERPL-MCNC: 108 MG/DL (ref 74–100)
HCT VFR BLD AUTO: 20.4 % (ref 37–47)
HCT VFR BLD AUTO: 27.2 % (ref 37–47)
HCT VFR BLD AUTO: 29.1 % (ref 37–47)
HGB BLD-MCNC: 6.6 G/DL (ref 12–16)
HGB BLD-MCNC: 8.9 G/DL (ref 12–16)
HGB BLD-MCNC: 9.1 G/DL (ref 12–16)
IMM GRANULOCYTES # BLD AUTO: 0.04 X10(3)/MCL (ref 0–0.04)
IMM GRANULOCYTES NFR BLD AUTO: 0.4 %
INSTRUMENT WBC (OLG): 10.81 X10(3)/MCL
INSTRUMENT WBC (OLG): 8.84 X10(3)/MCL
LYMPHOCYTES # BLD AUTO: 1.29 X10(3)/MCL (ref 0.6–4.6)
LYMPHOCYTES NFR BLD AUTO: 12.8 %
LYMPHOCYTES NFR BLD MANUAL: 0.32 X10(3)/MCL
LYMPHOCYTES NFR BLD MANUAL: 0.62 X10(3)/MCL
LYMPHOCYTES NFR BLD MANUAL: 3 %
LYMPHOCYTES NFR BLD MANUAL: 7 %
MAGNESIUM SERPL-MCNC: 2 MG/DL (ref 1.6–2.6)
MCH RBC QN AUTO: 28.9 PG (ref 27–31)
MCH RBC QN AUTO: 28.9 PG (ref 27–31)
MCH RBC QN AUTO: 29.2 PG (ref 27–31)
MCHC RBC AUTO-ENTMCNC: 31.3 G/DL (ref 33–36)
MCHC RBC AUTO-ENTMCNC: 32.4 G/DL (ref 33–36)
MCHC RBC AUTO-ENTMCNC: 32.7 G/DL (ref 33–36)
MCV RBC AUTO: 89.2 FL (ref 80–94)
MCV RBC AUTO: 89.5 FL (ref 80–94)
MCV RBC AUTO: 92.4 FL (ref 80–94)
MICROCYTES BLD QL SMEAR: ABNORMAL
MONOCYTES # BLD AUTO: 1.04 X10(3)/MCL (ref 0.1–1.3)
MONOCYTES NFR BLD AUTO: 10.3 %
MONOCYTES NFR BLD MANUAL: 0.22 X10(3)/MCL (ref 0.1–1.3)
MONOCYTES NFR BLD MANUAL: 0.44 X10(3)/MCL (ref 0.1–1.3)
MONOCYTES NFR BLD MANUAL: 2 %
MONOCYTES NFR BLD MANUAL: 5 %
NEUTROPHILS # BLD AUTO: 7.6 X10(3)/MCL (ref 2.1–9.2)
NEUTROPHILS NFR BLD AUTO: 75.6 %
NEUTROPHILS NFR BLD MANUAL: 88 %
NEUTROPHILS NFR BLD MANUAL: 95 %
NRBC BLD AUTO-RTO: 0 %
PHOSPHATE SERPL-MCNC: 2.4 MG/DL (ref 2.3–4.7)
PLATELET # BLD AUTO: 125 X10(3)/MCL (ref 130–400)
PLATELET # BLD AUTO: 142 X10(3)/MCL (ref 130–400)
PLATELET # BLD AUTO: 157 X10(3)/MCL (ref 130–400)
PLATELET # BLD EST: ABNORMAL 10*3/UL
PLATELET # BLD EST: NORMAL 10*3/UL
PLATELETS.RETICULATED NFR BLD AUTO: 5.9 % (ref 0.9–11.2)
PMV BLD AUTO: 10.5 FL (ref 7.4–10.4)
PMV BLD AUTO: 10.6 FL (ref 7.4–10.4)
PMV BLD AUTO: 10.9 FL (ref 7.4–10.4)
POIKILOCYTOSIS BLD QL SMEAR: ABNORMAL
POTASSIUM SERPL-SCNC: 4.5 MMOL/L (ref 3.5–5.1)
PROMYELOCYTES # BLD MANUAL: 1 %
PROT SERPL-MCNC: 4.6 GM/DL (ref 6.4–8.3)
RBC # BLD AUTO: 2.28 X10(6)/MCL (ref 4.2–5.4)
RBC # BLD AUTO: 3.05 X10(6)/MCL (ref 4.2–5.4)
RBC # BLD AUTO: 3.15 X10(6)/MCL (ref 4.2–5.4)
RBC MORPH BLD: ABNORMAL
RBC MORPH BLD: NORMAL
SODIUM SERPL-SCNC: 134 MMOL/L (ref 136–145)
STOMATOCYTES (OLG): ABNORMAL
UNIT NUMBER: NORMAL
UNIT NUMBER: NORMAL
WBC # SPEC AUTO: 10.06 X10(3)/MCL (ref 4.5–11.5)
WBC # SPEC AUTO: 10.81 X10(3)/MCL (ref 4.5–11.5)
WBC # SPEC AUTO: 8.84 X10(3)/MCL (ref 4.5–11.5)

## 2023-12-29 PROCEDURE — 36000707: Performed by: SURGERY

## 2023-12-29 PROCEDURE — 83735 ASSAY OF MAGNESIUM: CPT

## 2023-12-29 PROCEDURE — 63600175 PHARM REV CODE 636 W HCPCS: Performed by: STUDENT IN AN ORGANIZED HEALTH CARE EDUCATION/TRAINING PROGRAM

## 2023-12-29 PROCEDURE — 0YQ10ZZ REPAIR LEFT BUTTOCK, OPEN APPROACH: ICD-10-PCS | Performed by: EMERGENCY MEDICINE

## 2023-12-29 PROCEDURE — 25000003 PHARM REV CODE 250: Performed by: NURSE ANESTHETIST, CERTIFIED REGISTERED

## 2023-12-29 PROCEDURE — 86923 COMPATIBILITY TEST ELECTRIC: CPT | Mod: 91

## 2023-12-29 PROCEDURE — 27218 TREAT PELVIC RING FRACTURE: CPT | Mod: 58,,, | Performed by: ORTHOPAEDIC SURGERY

## 2023-12-29 PROCEDURE — 63600175 PHARM REV CODE 636 W HCPCS: Performed by: ANESTHESIOLOGY

## 2023-12-29 PROCEDURE — 11000001 HC ACUTE MED/SURG PRIVATE ROOM

## 2023-12-29 PROCEDURE — P9016 RBC LEUKOCYTES REDUCED: HCPCS

## 2023-12-29 PROCEDURE — 63600175 PHARM REV CODE 636 W HCPCS: Performed by: NURSE ANESTHETIST, CERTIFIED REGISTERED

## 2023-12-29 PROCEDURE — 36000706: Performed by: SURGERY

## 2023-12-29 PROCEDURE — 27201423 OPTIME MED/SURG SUP & DEVICES STERILE SUPPLY: Performed by: SURGERY

## 2023-12-29 PROCEDURE — D9220A PRA ANESTHESIA: Mod: ANES,,, | Performed by: ANESTHESIOLOGY

## 2023-12-29 PROCEDURE — 25000003 PHARM REV CODE 250

## 2023-12-29 PROCEDURE — 84100 ASSAY OF PHOSPHORUS: CPT

## 2023-12-29 PROCEDURE — D9220A PRA ANESTHESIA: Mod: CRNA,,, | Performed by: NURSE ANESTHETIST, CERTIFIED REGISTERED

## 2023-12-29 PROCEDURE — 71000033 HC RECOVERY, INTIAL HOUR: Performed by: SURGERY

## 2023-12-29 PROCEDURE — 63600175 PHARM REV CODE 636 W HCPCS

## 2023-12-29 PROCEDURE — 37000008 HC ANESTHESIA 1ST 15 MINUTES: Performed by: SURGERY

## 2023-12-29 PROCEDURE — 37000009 HC ANESTHESIA EA ADD 15 MINS: Performed by: SURGERY

## 2023-12-29 PROCEDURE — 85007 BL SMEAR W/DIFF WBC COUNT: CPT

## 2023-12-29 PROCEDURE — 63600175 PHARM REV CODE 636 W HCPCS: Performed by: ORTHOPAEDIC SURGERY

## 2023-12-29 PROCEDURE — 80053 COMPREHEN METABOLIC PANEL: CPT

## 2023-12-29 PROCEDURE — 36430 TRANSFUSION BLD/BLD COMPNT: CPT

## 2023-12-29 PROCEDURE — 25000003 PHARM REV CODE 250: Performed by: SURGERY

## 2023-12-29 PROCEDURE — 85027 COMPLETE CBC AUTOMATED: CPT

## 2023-12-29 PROCEDURE — C1713 ANCHOR/SCREW BN/BN,TIS/BN: HCPCS | Performed by: SURGERY

## 2023-12-29 RX ORDER — FENTANYL CITRATE 50 UG/ML
INJECTION, SOLUTION INTRAMUSCULAR; INTRAVENOUS
Status: DISCONTINUED | OUTPATIENT
Start: 2023-12-29 | End: 2023-12-29

## 2023-12-29 RX ORDER — VANCOMYCIN HYDROCHLORIDE 1 G/20ML
INJECTION, POWDER, LYOPHILIZED, FOR SOLUTION INTRAVENOUS
Status: DISCONTINUED | OUTPATIENT
Start: 2023-12-29 | End: 2023-12-29 | Stop reason: HOSPADM

## 2023-12-29 RX ORDER — SODIUM CHLORIDE, SODIUM GLUCONATE, SODIUM ACETATE, POTASSIUM CHLORIDE AND MAGNESIUM CHLORIDE 30; 37; 368; 526; 502 MG/100ML; MG/100ML; MG/100ML; MG/100ML; MG/100ML
INJECTION, SOLUTION INTRAVENOUS CONTINUOUS
Status: CANCELLED | OUTPATIENT
Start: 2023-12-29 | End: 2024-01-28

## 2023-12-29 RX ORDER — FAMOTIDINE 20 MG/1
20 TABLET, FILM COATED ORAL 2 TIMES DAILY
Status: DISCONTINUED | OUTPATIENT
Start: 2023-12-29 | End: 2024-01-03

## 2023-12-29 RX ORDER — PROPOFOL 10 MG/ML
VIAL (ML) INTRAVENOUS
Status: DISCONTINUED | OUTPATIENT
Start: 2023-12-29 | End: 2023-12-29

## 2023-12-29 RX ORDER — MIDAZOLAM HYDROCHLORIDE 1 MG/ML
2 INJECTION INTRAMUSCULAR; INTRAVENOUS ONCE AS NEEDED
Status: CANCELLED | OUTPATIENT
Start: 2023-12-29 | End: 2035-05-27

## 2023-12-29 RX ORDER — MEPERIDINE HYDROCHLORIDE 25 MG/ML
12.5 INJECTION INTRAMUSCULAR; INTRAVENOUS; SUBCUTANEOUS ONCE
Status: DISCONTINUED | OUTPATIENT
Start: 2023-12-29 | End: 2023-12-29 | Stop reason: HOSPADM

## 2023-12-29 RX ORDER — BACITRACIN 500 [USP'U]/G
OINTMENT TOPICAL
Status: DISCONTINUED | OUTPATIENT
Start: 2023-12-29 | End: 2023-12-29 | Stop reason: HOSPADM

## 2023-12-29 RX ORDER — ONDANSETRON 2 MG/ML
4 INJECTION INTRAMUSCULAR; INTRAVENOUS ONCE
Status: DISCONTINUED | OUTPATIENT
Start: 2023-12-29 | End: 2023-12-29 | Stop reason: HOSPADM

## 2023-12-29 RX ORDER — LIDOCAINE HYDROCHLORIDE 20 MG/ML
INJECTION INTRAVENOUS
Status: DISCONTINUED | OUTPATIENT
Start: 2023-12-29 | End: 2023-12-29

## 2023-12-29 RX ORDER — SODIUM CHLORIDE, SODIUM LACTATE, POTASSIUM CHLORIDE, CALCIUM CHLORIDE 600; 310; 30; 20 MG/100ML; MG/100ML; MG/100ML; MG/100ML
INJECTION, SOLUTION INTRAVENOUS CONTINUOUS
Status: CANCELLED | OUTPATIENT
Start: 2023-12-29

## 2023-12-29 RX ORDER — ONDANSETRON 2 MG/ML
INJECTION INTRAMUSCULAR; INTRAVENOUS
Status: DISCONTINUED | OUTPATIENT
Start: 2023-12-29 | End: 2023-12-29

## 2023-12-29 RX ORDER — ROCURONIUM BROMIDE 10 MG/ML
INJECTION, SOLUTION INTRAVENOUS
Status: DISCONTINUED | OUTPATIENT
Start: 2023-12-29 | End: 2023-12-29

## 2023-12-29 RX ORDER — PHENYLEPHRINE HYDROCHLORIDE 10 MG/ML
INJECTION INTRAVENOUS
Status: DISCONTINUED | OUTPATIENT
Start: 2023-12-29 | End: 2023-12-29

## 2023-12-29 RX ORDER — HEPARIN 100 UNIT/ML
5 SYRINGE INTRAVENOUS
Status: DISCONTINUED | OUTPATIENT
Start: 2023-12-29 | End: 2024-01-05 | Stop reason: HOSPADM

## 2023-12-29 RX ORDER — SODIUM CHLORIDE 9 MG/ML
INJECTION, SOLUTION INTRAVENOUS CONTINUOUS
Status: CANCELLED | OUTPATIENT
Start: 2023-12-29

## 2023-12-29 RX ORDER — DEXAMETHASONE SODIUM PHOSPHATE 4 MG/ML
INJECTION, SOLUTION INTRA-ARTICULAR; INTRALESIONAL; INTRAMUSCULAR; INTRAVENOUS; SOFT TISSUE
Status: DISCONTINUED | OUTPATIENT
Start: 2023-12-29 | End: 2023-12-29

## 2023-12-29 RX ORDER — HYDROMORPHONE HYDROCHLORIDE 2 MG/ML
0.4 INJECTION, SOLUTION INTRAMUSCULAR; INTRAVENOUS; SUBCUTANEOUS EVERY 5 MIN PRN
Status: DISCONTINUED | OUTPATIENT
Start: 2023-12-29 | End: 2023-12-29 | Stop reason: HOSPADM

## 2023-12-29 RX ORDER — MORPHINE SULFATE 4 MG/ML
4 INJECTION, SOLUTION INTRAMUSCULAR; INTRAVENOUS ONCE
Status: COMPLETED | OUTPATIENT
Start: 2023-12-29 | End: 2023-12-29

## 2023-12-29 RX ADMIN — MORPHINE SULFATE 4 MG: 4 INJECTION, SOLUTION INTRAMUSCULAR; INTRAVENOUS at 04:12

## 2023-12-29 RX ADMIN — DOCUSATE SODIUM 100 MG: 100 CAPSULE, LIQUID FILLED ORAL at 08:12

## 2023-12-29 RX ADMIN — TRAZODONE HYDROCHLORIDE 50 MG: 50 TABLET ORAL at 09:12

## 2023-12-29 RX ADMIN — ONDANSETRON 4 MG: 2 INJECTION INTRAMUSCULAR; INTRAVENOUS at 11:12

## 2023-12-29 RX ADMIN — CEFAZOLIN SODIUM 2 G: 2 SOLUTION INTRAVENOUS at 02:12

## 2023-12-29 RX ADMIN — METHOCARBAMOL 500 MG: 500 TABLET ORAL at 02:12

## 2023-12-29 RX ADMIN — SODIUM CHLORIDE, SODIUM GLUCONATE, SODIUM ACETATE, POTASSIUM CHLORIDE AND MAGNESIUM CHLORIDE: 526; 502; 368; 37; 30 INJECTION, SOLUTION INTRAVENOUS at 10:12

## 2023-12-29 RX ADMIN — ROCURONIUM BROMIDE 50 MG: 10 SOLUTION INTRAVENOUS at 10:12

## 2023-12-29 RX ADMIN — GABAPENTIN 300 MG: 300 CAPSULE ORAL at 08:12

## 2023-12-29 RX ADMIN — SODIUM CHLORIDE: 9 INJECTION, SOLUTION INTRAVENOUS at 05:12

## 2023-12-29 RX ADMIN — PHENYLEPHRINE HYDROCHLORIDE 100 MCG: 10 INJECTION INTRAVENOUS at 10:12

## 2023-12-29 RX ADMIN — OXYCODONE HYDROCHLORIDE 10 MG: 10 TABLET ORAL at 08:12

## 2023-12-29 RX ADMIN — METHOCARBAMOL 500 MG: 500 TABLET ORAL at 09:12

## 2023-12-29 RX ADMIN — DEXAMETHASONE SODIUM PHOSPHATE 4 MG: 4 INJECTION, SOLUTION INTRA-ARTICULAR; INTRALESIONAL; INTRAMUSCULAR; INTRAVENOUS; SOFT TISSUE at 10:12

## 2023-12-29 RX ADMIN — OXYCODONE HYDROCHLORIDE 10 MG: 10 TABLET ORAL at 03:12

## 2023-12-29 RX ADMIN — HYDROMORPHONE HYDROCHLORIDE 0.4 MG: 2 INJECTION INTRAMUSCULAR; INTRAVENOUS; SUBCUTANEOUS at 12:12

## 2023-12-29 RX ADMIN — BACITRACIN: 500 OINTMENT TOPICAL at 09:12

## 2023-12-29 RX ADMIN — MUPIROCIN: 20 OINTMENT TOPICAL at 09:12

## 2023-12-29 RX ADMIN — SODIUM CHLORIDE: 9 INJECTION, SOLUTION INTRAVENOUS at 02:12

## 2023-12-29 RX ADMIN — LIDOCAINE HYDROCHLORIDE 80 MG: 20 INJECTION INTRAVENOUS at 10:12

## 2023-12-29 RX ADMIN — ACETAMINOPHEN 325MG 650 MG: 325 TABLET ORAL at 04:12

## 2023-12-29 RX ADMIN — DOCUSATE SODIUM 100 MG: 100 CAPSULE, LIQUID FILLED ORAL at 09:12

## 2023-12-29 RX ADMIN — MUPIROCIN: 20 OINTMENT TOPICAL at 08:12

## 2023-12-29 RX ADMIN — PROPOFOL 200 MG: 10 INJECTION, EMULSION INTRAVENOUS at 10:12

## 2023-12-29 RX ADMIN — ACETAMINOPHEN 325MG 650 MG: 325 TABLET ORAL at 03:12

## 2023-12-29 RX ADMIN — CEFAZOLIN SODIUM 2 G: 2 SOLUTION INTRAVENOUS at 05:12

## 2023-12-29 RX ADMIN — SUGAMMADEX 200 MG: 100 INJECTION, SOLUTION INTRAVENOUS at 11:12

## 2023-12-29 RX ADMIN — FENTANYL CITRATE 50 MCG: 50 INJECTION, SOLUTION INTRAMUSCULAR; INTRAVENOUS at 10:12

## 2023-12-29 RX ADMIN — FAMOTIDINE 20 MG: 20 TABLET ORAL at 08:12

## 2023-12-29 RX ADMIN — GABAPENTIN 300 MG: 300 CAPSULE ORAL at 02:12

## 2023-12-29 RX ADMIN — METHOCARBAMOL 500 MG: 500 TABLET ORAL at 04:12

## 2023-12-29 RX ADMIN — GABAPENTIN 300 MG: 300 CAPSULE ORAL at 09:12

## 2023-12-29 RX ADMIN — PHENYLEPHRINE HYDROCHLORIDE 100 MCG: 10 INJECTION INTRAVENOUS at 11:12

## 2023-12-29 RX ADMIN — DIVALPROEX SODIUM 500 MG: 500 TABLET, FILM COATED, EXTENDED RELEASE ORAL at 09:12

## 2023-12-29 RX ADMIN — HYDROMORPHONE HYDROCHLORIDE 0.4 MG: 2 INJECTION INTRAMUSCULAR; INTRAVENOUS; SUBCUTANEOUS at 01:12

## 2023-12-29 RX ADMIN — ACETAMINOPHEN 325MG 650 MG: 325 TABLET ORAL at 09:12

## 2023-12-29 RX ADMIN — BACITRACIN: 500 OINTMENT TOPICAL at 03:12

## 2023-12-29 RX ADMIN — FAMOTIDINE 20 MG: 20 TABLET ORAL at 09:12

## 2023-12-29 RX ADMIN — DIVALPROEX SODIUM 500 MG: 500 TABLET, FILM COATED, EXTENDED RELEASE ORAL at 08:12

## 2023-12-29 RX ADMIN — FENTANYL CITRATE 50 MCG: 50 INJECTION, SOLUTION INTRAMUSCULAR; INTRAVENOUS at 11:12
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
## 2023-12-29 NOTE — TRANSFER OF CARE
"Anesthesia Transfer of Care Note    Patient: Sandra Hernandez    Procedure(s) Performed: Procedure(s) (LRB):  INSERTION RIGHT SACROILIAC SCREWS (Right)  WASHOUT (Left)  CLOSURE, WOUND (Left)    Patient location: PACU    Anesthesia Type: general    Transport from OR: Transported from OR on room air with adequate spontaneous ventilation    Post pain: adequate analgesia    Post assessment: no apparent anesthetic complications    Post vital signs: stable    Level of consciousness: awake    Nausea/Vomiting: no nausea/vomiting    Complications: none    Transfer of care protocol was followed      Last vitals: Visit Vitals  /70   Pulse 107   Temp 36 °C (96.8 °F)   Resp 12   Ht 5' 3" (1.6 m)   Wt 68 kg (150 lb)   SpO2 96%   Breastfeeding No   BMI 26.57 kg/m²     "

## 2023-12-29 NOTE — PROGRESS NOTES
Patient Name: Sandra Hernandez  MRN: [id number]  Admission Date: 12/28/2023  Hospital Length of Stay: 1 days  Attending Provider: Alfonso Cohen Jr., *  Primary Care Provider: Marc Crhisty MD  Follow-up For: Procedure(s) (LRB):  INSERTION RIGHT SACROILIAC SCREWS (Right)    Post-Operative Day: Day of Surgery  Subjective:       Principal Orthopedic Problem:  Postoperative day 1 Status post intramedullary nailing of right open tibia      Interval History:  Patient is resting comfortably this morning.  She states she still has pain with any attempted range motion of her limb in the right side.  She is currently NPO and ready for operative stabilization of her posterior pelvic ring today.  Grandmother is at the bedside.  No acute issues overnight.    Review of patient's allergies indicates:   Allergen Reactions    Aspirin     Haldol [haloperidol lactate]        Current Facility-Administered Medications   Medication    0.9%  NaCl infusion (for blood administration)    0.9%  NaCl infusion    acetaminophen tablet 650 mg    bacitracin ointment    cefazolin (ANCEF) 2 gram in dextrose 5% 50 mL IVPB (premix)    divalproex ER 24 hr tablet 500 mg    docusate sodium capsule 100 mg    famotidine tablet 20 mg    gabapentin capsule 300 mg    heparin, porcine (PF) 100 unit/mL injection flush 500 Units    hydrOXYzine pamoate capsule 25 mg    LORazepam tablet 1 mg    magnesium hydroxide 400 mg/5 ml suspension 2,400 mg    melatonin tablet 6 mg    methocarbamoL tablet 500 mg    mupirocin 2 % ointment    oxyCODONE immediate release tablet 5 mg    oxyCODONE immediate release tablet Tab 10 mg    polyethylene glycol packet 17 g    traZODone tablet 50 mg     Objective:     Vital Signs (Most Recent):  Temp: 100 °F (37.8 °C) (12/29/23 0747)  Pulse: 108 (12/29/23 0747)  Resp: 18 (12/29/23 0747)  BP: 117/61 (12/29/23 0747)  SpO2: (!) 94 % (12/29/23 0747) Vital Signs (24h Range):  Temp:  [97.7 °F (36.5 °C)-100 °F (37.8 °C)] 100 °F (37.8 °C)  Pulse:   "[100-123] 108  Resp:  [12-28] 18  SpO2:  [94 %-100 %] 94 %  BP: (109-152)/(55-85) 117/61     Weight: 68 kg (150 lb)  Height: 5' 3" (160 cm)  Body mass index is 26.57 kg/m².      Intake/Output Summary (Last 24 hours) at 12/29/2023 0815  Last data filed at 12/29/2023 0635  Gross per 24 hour   Intake 5238.04 ml   Output 2150 ml   Net 3088.04 ml       Physical Exam:   Ortho/SPM Exam    General the patient is alert and in no acute distress nontoxic-appearing appropriate affect.    Constitutional: Vital signs are examined and stable.  Resp: No signs of labored breathing    Musculoskeletal Exam:  Right lower extremity:  Dressings clean and dry to the right tibia, lower limb compartments are swollen but compressible.  She is able to perform dorsiflexion and plantar flexion of the ankle and digits.  Palpable DP pulse.  Sensation light touch intact distally.  She tolerates gentle passive circumduction of the right hip.  Right thigh soft and compressible, no open wounds or abrasions.    Diagnostic Findings:   Significant Labs: BMP:   Recent Labs   Lab 12/28/23  0454      K 4.6   CO2 19*   BUN 14.3   CREATININE 0.80   CALCIUM 8.4     CBC:   Recent Labs   Lab 12/28/23  1158 12/28/23  1829 12/28/23  2219   WBC 12.55  12.55* 9.16  9.16 9.58  9.58   HGB 9.7* 8.2* 7.1*   HCT 31.2* 25.6* 22.5*    180 180     CMP:   Recent Labs   Lab 12/28/23  0454      K 4.6   CO2 19*   BUN 14.3   CREATININE 0.80   CALCIUM 8.4   ALBUMIN 3.6   BILITOT 0.2   ALKPHOS 56   *   ALT 63*     All pertinent labs within the past 24 hours have been reviewed.        Significant Imaging: I have reviewed all pertinent imaging results/findings.     Assessment/Plan:     Active Diagnoses:    Diagnosis Date Noted POA    PRINCIPAL PROBLEM:  Type I or II open fracture of right tibia and fibula [S82.201B, S82.401B] 12/28/2023 Yes    Contusion of right lung [S27.321A] 12/29/2023 Yes    Pelvic hematoma in female [N94.89] 12/29/2023 Yes    " Closed fracture of transverse process of lumbar vertebra [S32.009A] 12/29/2023 Yes    Closed fracture of spinous process of thoracic vertebra [S22.008A] 12/29/2023 Yes    Closed fracture of spinous process of lumbar vertebra [S32.009A] 12/29/2023 Yes    Closed bilateral fracture of pubic rami [S32.591A, S32.592A] 12/29/2023 Yes    Closed fracture of sacrum [S32.10XA] 12/29/2023 Yes    Left ankle pain [M25.572] 12/28/2023 Yes      Problems Resolved During this Admission:     H/H is down today as expected due to acute blood loss anemia from surgery.  She has pelvic ring injury with a complete sacral fracture that will benefit from posterior transsacral trans iliac screws.  Plan to take her to the operating room later today for fixation. The risks, benefits and alternatives treatment were discussed at length with the patient today including but not limited to pain, bleeding, scarring, infection, damage to neurovascular structures, malunion/nonunion, hardware failure/irritation, need for future procedures and complications leading to amputation and even death.  Transfusion per primary team.  All questions and concerns were addressed she and her grandmother understand and agree with our plan.      This note/OR report was created with the assistance of  voice recognition software or phone  dictation.  There may be transcription errors as a result of using this technology however minimal. Effort has been made to assure accuracy of transcription but any obvious errors or omissions should be clarified with the author of the document.           Ravin Ferreira MD  Orthopedic Trauma Surgery  [facility] - [facility]
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
## 2023-12-29 NOTE — PROGRESS NOTES
Pt Hx and procedure discussed in detail. Post op orders reviewed. Pt attached to v/s machine and vitals reviewed. Procedure site, Ivs, and ascencio assessed and intact. Everyone understands pt info with no further questions.

## 2023-12-29 NOTE — BRIEF OP NOTE
[facility] - Periop Services  Brief Operative Note    SUMMARY     Surgery Date: 12/29/2023     Surgeon(s) and Role:     * Ravin Ferreira MD - Primary    Assisting Surgeon: None    Pre-op Diagnosis:  Multiple closed fractures of pelvis with disruption of pelvic ring, initial encounter [S32.810A]    Post-op Diagnosis:  Post-Op Diagnosis Codes:     * Multiple closed fractures of pelvis with disruption of pelvic ring, initial encounter [S32.810A]    Procedure(s) (LRB):  INSERTION RIGHT SACROILIAC SCREWS (Right)- S1 and S2    Anesthesia: General    Implants:  Osteocentric 7.0 screws x 2    Operative Findings: see op report    Estimated Blood Loss: 20mL    Estimated Blood Loss has been documented.         Specimens:   Specimen (24h ago, onward)      None            [id number]    A/P: Tolerated procedure well. Admit to floor. Ok to use BLE for stand to transfer only, no ambulation. Lovenox for DVT ppx. Ancef for 24hrs.        Ravin Ferreira MD  Orthopedic Trauma  [facility]

## 2023-12-29 NOTE — NURSING
Nurses Note -- 4 Eyes      12/28/2023   6:19 PM      Skin assessed during: Admit      [] No Altered Skin Integrity Present    []Prevention Measures Documented      [x] Yes- Altered Skin Integrity Present or Discovered   [] LDA Added if Not in Epic (Describe Wound)   [x] New Altered Skin Integrity was Present on Admit and Documented in LDA   [] Wound Image Taken    Wound Care Consulted? Yes    Attending Nurse:  Blake Mir LPN     Second RN/Staff Member:   Ximena Desai RN
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
## 2023-12-29 NOTE — ANESTHESIA PROCEDURE NOTES
Intubation    Date/Time: 12/29/2023 10:23 AM    Performed by: Dabadie, Virginia G, CRNA  Authorized by: Aung Briggs MD    Intubation:     Induction:  Intravenous    Intubated:  Postinduction    Mask Ventilation:  Easy with oral airway    Attempts:  1    Attempted By:  CRNA    Method of Intubation:  Video laryngoscopy    Blade:  Grove 3    Laryngeal View Grade: Grade I - full view of cords      Difficult Airway Encountered?: No      Complications:  None (excessive hair with poor dentition)    Airway Device:  Oral endotracheal tube    Airway Device Size:  7.0    Style/Cuff Inflation:  Cuffed (inflated to minimal occlusive pressure)    Tube secured:  21    Secured at:  The lips    Placement Verified By:  Capnometry    Complicating Factors:  None    Findings Post-Intubation:  BS equal bilateral and atraumatic/condition of teeth unchanged

## 2023-12-29 NOTE — NURSING
Call to trauma staff at [phone] to speak with MACKENZEI Valerio; notified of temp this am 100.2, and also left upper and lower leg mild swelling, redness and warmth. Positive pedal pulses palpable +2 to bilateral lower extremeties. No new orders at present.
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
## 2023-12-30 LAB
ABO + RH BLD: NORMAL
ABO + RH BLD: NORMAL
ALBUMIN SERPL-MCNC: 2.3 G/DL (ref 3.5–5)
ALBUMIN/GLOB SERPL: 1 RATIO (ref 1.1–2)
ALP SERPL-CCNC: 46 UNIT/L (ref 40–150)
ALT SERPL-CCNC: 27 UNIT/L (ref 0–55)
AST SERPL-CCNC: 69 UNIT/L (ref 5–34)
BASOPHILS # BLD AUTO: 0.01 X10(3)/MCL
BASOPHILS # BLD AUTO: 0.02 X10(3)/MCL
BASOPHILS # BLD AUTO: 0.03 X10(3)/MCL
BASOPHILS NFR BLD AUTO: 0.1 %
BASOPHILS NFR BLD AUTO: 0.2 %
BASOPHILS NFR BLD AUTO: 0.4 %
BILIRUB SERPL-MCNC: 0.3 MG/DL
BLD PROD TYP BPU: NORMAL
BLD PROD TYP BPU: NORMAL
BLOOD UNIT EXPIRATION DATE: NORMAL
BLOOD UNIT EXPIRATION DATE: NORMAL
BLOOD UNIT TYPE CODE: 5100
BLOOD UNIT TYPE CODE: 5100
BUN SERPL-MCNC: 7.5 MG/DL (ref 7–18.7)
CALCIUM SERPL-MCNC: 6.9 MG/DL (ref 8.4–10.2)
CHLORIDE SERPL-SCNC: 108 MMOL/L (ref 98–107)
CO2 SERPL-SCNC: 22 MMOL/L (ref 22–29)
CREAT SERPL-MCNC: 0.55 MG/DL (ref 0.55–1.02)
CROSSMATCH INTERPRETATION: NORMAL
CROSSMATCH INTERPRETATION: NORMAL
DISPENSE STATUS: NORMAL
DISPENSE STATUS: NORMAL
EOSINOPHIL # BLD AUTO: 0 X10(3)/MCL (ref 0–0.9)
EOSINOPHIL # BLD AUTO: 0.02 X10(3)/MCL (ref 0–0.9)
EOSINOPHIL # BLD AUTO: 0.08 X10(3)/MCL (ref 0–0.9)
EOSINOPHIL NFR BLD AUTO: 0 %
EOSINOPHIL NFR BLD AUTO: 0.2 %
EOSINOPHIL NFR BLD AUTO: 0.9 %
ERYTHROCYTE [DISTWIDTH] IN BLOOD BY AUTOMATED COUNT: 15.8 % (ref 11.5–17)
ERYTHROCYTE [DISTWIDTH] IN BLOOD BY AUTOMATED COUNT: 15.8 % (ref 11.5–17)
ERYTHROCYTE [DISTWIDTH] IN BLOOD BY AUTOMATED COUNT: 15.9 % (ref 11.5–17)
GFR SERPLBLD CREATININE-BSD FMLA CKD-EPI: >60 MLS/MIN/1.73/M2
GLOBULIN SER-MCNC: 2.3 GM/DL (ref 2.4–3.5)
GLUCOSE SERPL-MCNC: 102 MG/DL (ref 74–100)
HCT VFR BLD AUTO: 21.2 % (ref 37–47)
HCT VFR BLD AUTO: 22.1 % (ref 37–47)
HCT VFR BLD AUTO: 26.6 % (ref 37–47)
HGB BLD-MCNC: 7.2 G/DL (ref 12–16)
HGB BLD-MCNC: 7.2 G/DL (ref 12–16)
HGB BLD-MCNC: 8.4 G/DL (ref 12–16)
IMM GRANULOCYTES # BLD AUTO: 0.05 X10(3)/MCL (ref 0–0.04)
IMM GRANULOCYTES # BLD AUTO: 0.07 X10(3)/MCL (ref 0–0.04)
IMM GRANULOCYTES # BLD AUTO: 0.09 X10(3)/MCL (ref 0–0.04)
IMM GRANULOCYTES NFR BLD AUTO: 0.6 %
IMM GRANULOCYTES NFR BLD AUTO: 0.7 %
IMM GRANULOCYTES NFR BLD AUTO: 1.1 %
LACTATE SERPL-SCNC: 1 MMOL/L (ref 0.5–2.2)
LYMPHOCYTES # BLD AUTO: 1.25 X10(3)/MCL (ref 0.6–4.6)
LYMPHOCYTES # BLD AUTO: 1.67 X10(3)/MCL (ref 0.6–4.6)
LYMPHOCYTES # BLD AUTO: 1.83 X10(3)/MCL (ref 0.6–4.6)
LYMPHOCYTES NFR BLD AUTO: 13.9 %
LYMPHOCYTES NFR BLD AUTO: 17 %
LYMPHOCYTES NFR BLD AUTO: 21.7 %
MCH RBC QN AUTO: 28.2 PG (ref 27–31)
MCH RBC QN AUTO: 29 PG (ref 27–31)
MCH RBC QN AUTO: 29.4 PG (ref 27–31)
MCHC RBC AUTO-ENTMCNC: 31.6 G/DL (ref 33–36)
MCHC RBC AUTO-ENTMCNC: 32.6 G/DL (ref 33–36)
MCHC RBC AUTO-ENTMCNC: 34 G/DL (ref 33–36)
MCV RBC AUTO: 86.5 FL (ref 80–94)
MCV RBC AUTO: 89.1 FL (ref 80–94)
MCV RBC AUTO: 89.3 FL (ref 80–94)
MONOCYTES # BLD AUTO: 0.67 X10(3)/MCL (ref 0.1–1.3)
MONOCYTES # BLD AUTO: 0.81 X10(3)/MCL (ref 0.1–1.3)
MONOCYTES # BLD AUTO: 0.9 X10(3)/MCL (ref 0.1–1.3)
MONOCYTES NFR BLD AUTO: 7.9 %
MONOCYTES NFR BLD AUTO: 9 %
MONOCYTES NFR BLD AUTO: 9.2 %
NEUTROPHILS # BLD AUTO: 5.75 X10(3)/MCL (ref 2.1–9.2)
NEUTROPHILS # BLD AUTO: 6.86 X10(3)/MCL (ref 2.1–9.2)
NEUTROPHILS # BLD AUTO: 7.14 X10(3)/MCL (ref 2.1–9.2)
NEUTROPHILS NFR BLD AUTO: 68 %
NEUTROPHILS NFR BLD AUTO: 72.7 %
NEUTROPHILS NFR BLD AUTO: 76.4 %
NRBC BLD AUTO-RTO: 0 %
PLATELET # BLD AUTO: 132 X10(3)/MCL (ref 130–400)
PLATELET # BLD AUTO: 141 X10(3)/MCL (ref 130–400)
PLATELET # BLD AUTO: 150 X10(3)/MCL (ref 130–400)
PLATELETS.RETICULATED NFR BLD AUTO: 4.3 % (ref 0.9–11.2)
PLATELETS.RETICULATED NFR BLD AUTO: 5.4 % (ref 0.9–11.2)
PMV BLD AUTO: 10.5 FL (ref 7.4–10.4)
PMV BLD AUTO: 10.7 FL (ref 7.4–10.4)
PMV BLD AUTO: 11.1 FL (ref 7.4–10.4)
POTASSIUM SERPL-SCNC: 4.1 MMOL/L (ref 3.5–5.1)
PROT SERPL-MCNC: 4.6 GM/DL (ref 6.4–8.3)
RBC # BLD AUTO: 2.45 X10(6)/MCL (ref 4.2–5.4)
RBC # BLD AUTO: 2.48 X10(6)/MCL (ref 4.2–5.4)
RBC # BLD AUTO: 2.98 X10(6)/MCL (ref 4.2–5.4)
SODIUM SERPL-SCNC: 137 MMOL/L (ref 136–145)
UNIT NUMBER: NORMAL
UNIT NUMBER: NORMAL
WBC # SPEC AUTO: 8.45 X10(3)/MCL (ref 4.5–11.5)
WBC # SPEC AUTO: 8.98 X10(3)/MCL (ref 4.5–11.5)
WBC # SPEC AUTO: 9.82 X10(3)/MCL (ref 4.5–11.5)

## 2023-12-30 PROCEDURE — 85025 COMPLETE CBC W/AUTO DIFF WBC: CPT

## 2023-12-30 PROCEDURE — 25500020 PHARM REV CODE 255: Performed by: SURGERY

## 2023-12-30 PROCEDURE — P9016 RBC LEUKOCYTES REDUCED: HCPCS

## 2023-12-30 PROCEDURE — 36430 TRANSFUSION BLD/BLD COMPNT: CPT

## 2023-12-30 PROCEDURE — 25000003 PHARM REV CODE 250

## 2023-12-30 PROCEDURE — 11000001 HC ACUTE MED/SURG PRIVATE ROOM

## 2023-12-30 PROCEDURE — 80053 COMPREHEN METABOLIC PANEL: CPT

## 2023-12-30 PROCEDURE — 83605 ASSAY OF LACTIC ACID: CPT | Performed by: STUDENT IN AN ORGANIZED HEALTH CARE EDUCATION/TRAINING PROGRAM

## 2023-12-30 PROCEDURE — 27000221 HC OXYGEN, UP TO 24 HOURS

## 2023-12-30 PROCEDURE — 25000003 PHARM REV CODE 250: Performed by: STUDENT IN AN ORGANIZED HEALTH CARE EDUCATION/TRAINING PROGRAM

## 2023-12-30 PROCEDURE — 63600175 PHARM REV CODE 636 W HCPCS

## 2023-12-30 RX ORDER — SIMETHICONE 80 MG
1 TABLET,CHEWABLE ORAL 3 TIMES DAILY PRN
Status: DISCONTINUED | OUTPATIENT
Start: 2023-12-30 | End: 2024-01-05 | Stop reason: HOSPADM

## 2023-12-30 RX ORDER — CALCIUM GLUCONATE 20 MG/ML
1 INJECTION, SOLUTION INTRAVENOUS
Status: COMPLETED | OUTPATIENT
Start: 2023-12-30 | End: 2023-12-30

## 2023-12-30 RX ORDER — HYDROMORPHONE HYDROCHLORIDE 2 MG/ML
0.2 INJECTION, SOLUTION INTRAMUSCULAR; INTRAVENOUS; SUBCUTANEOUS EVERY 6 HOURS PRN
Status: DISCONTINUED | OUTPATIENT
Start: 2023-12-30 | End: 2024-01-05 | Stop reason: HOSPADM

## 2023-12-30 RX ADMIN — BACITRACIN: 500 OINTMENT TOPICAL at 09:12

## 2023-12-30 RX ADMIN — DIVALPROEX SODIUM 500 MG: 500 TABLET, FILM COATED, EXTENDED RELEASE ORAL at 10:12

## 2023-12-30 RX ADMIN — BACITRACIN: 500 OINTMENT TOPICAL at 03:12

## 2023-12-30 RX ADMIN — FAMOTIDINE 20 MG: 20 TABLET ORAL at 09:12

## 2023-12-30 RX ADMIN — ACETAMINOPHEN 325MG 650 MG: 325 TABLET ORAL at 09:12

## 2023-12-30 RX ADMIN — METHOCARBAMOL 500 MG: 500 TABLET ORAL at 03:12

## 2023-12-30 RX ADMIN — ACETAMINOPHEN 325MG 650 MG: 325 TABLET ORAL at 01:12

## 2023-12-30 RX ADMIN — METHOCARBAMOL 500 MG: 500 TABLET ORAL at 06:12

## 2023-12-30 RX ADMIN — TRAZODONE HYDROCHLORIDE 50 MG: 50 TABLET ORAL at 09:12

## 2023-12-30 RX ADMIN — IOPAMIDOL 100 ML: 755 INJECTION, SOLUTION INTRAVENOUS at 04:12

## 2023-12-30 RX ADMIN — POLYETHYLENE GLYCOL 3350 17 G: 17 POWDER, FOR SOLUTION ORAL at 09:12

## 2023-12-30 RX ADMIN — METHOCARBAMOL 500 MG: 500 TABLET ORAL at 09:12

## 2023-12-30 RX ADMIN — ACETAMINOPHEN 325MG 650 MG: 325 TABLET ORAL at 06:12

## 2023-12-30 RX ADMIN — GABAPENTIN 300 MG: 300 CAPSULE ORAL at 10:12

## 2023-12-30 RX ADMIN — DIVALPROEX SODIUM 500 MG: 500 TABLET, FILM COATED, EXTENDED RELEASE ORAL at 09:12

## 2023-12-30 RX ADMIN — BACITRACIN: 500 OINTMENT TOPICAL at 10:12

## 2023-12-30 RX ADMIN — DOCUSATE SODIUM 100 MG: 100 CAPSULE, LIQUID FILLED ORAL at 10:12

## 2023-12-30 RX ADMIN — ACETAMINOPHEN 325MG 650 MG: 325 TABLET ORAL at 03:12

## 2023-12-30 RX ADMIN — FAMOTIDINE 20 MG: 20 TABLET ORAL at 10:12

## 2023-12-30 RX ADMIN — CALCIUM GLUCONATE 1 G: 20 INJECTION, SOLUTION INTRAVENOUS at 08:12

## 2023-12-30 RX ADMIN — MUPIROCIN: 20 OINTMENT TOPICAL at 10:12

## 2023-12-30 RX ADMIN — DIATRIZOATE MEGLUMINE AND DIATRIZOATE SODIUM 30 ML: 660; 100 LIQUID ORAL; RECTAL at 04:12

## 2023-12-30 RX ADMIN — CALCIUM GLUCONATE 1 G: 20 INJECTION, SOLUTION INTRAVENOUS at 09:12

## 2023-12-30 RX ADMIN — OXYCODONE HYDROCHLORIDE 10 MG: 10 TABLET ORAL at 06:12

## 2023-12-30 RX ADMIN — OXYCODONE HYDROCHLORIDE 10 MG: 10 TABLET ORAL at 09:12

## 2023-12-30 RX ADMIN — POLYETHYLENE GLYCOL 3350 17 G: 17 POWDER, FOR SOLUTION ORAL at 10:12

## 2023-12-30 RX ADMIN — DOCUSATE SODIUM 100 MG: 100 CAPSULE, LIQUID FILLED ORAL at 09:12

## 2023-12-30 RX ADMIN — GABAPENTIN 300 MG: 300 CAPSULE ORAL at 03:12

## 2023-12-30 RX ADMIN — HYDROMORPHONE HYDROCHLORIDE 0.2 MG: 2 INJECTION INTRAMUSCULAR; INTRAVENOUS; SUBCUTANEOUS at 05:12

## 2023-12-30 RX ADMIN — GABAPENTIN 300 MG: 300 CAPSULE ORAL at 09:12

## 2023-12-30 RX ADMIN — HYDROMORPHONE HYDROCHLORIDE 0.2 MG: 2 INJECTION INTRAMUSCULAR; INTRAVENOUS; SUBCUTANEOUS at 12:12

## 2023-12-30 RX ADMIN — MUPIROCIN: 20 OINTMENT TOPICAL at 09:12

## 2023-12-30 RX ADMIN — OXYCODONE HYDROCHLORIDE 10 MG: 10 TABLET ORAL at 10:12

## 2023-12-30 NOTE — PROGRESS NOTES
[facility] Neuro  Orthopedics  Progress Note    Patient Name: Sandra Hernandez  MRN: [id number]  Admission Date: 12/28/2023  Hospital Length of Stay: 2 days  Attending Provider: Alfonso Cohen Jr., *  Primary Care Provider: Marc Christy MD  Follow-up For: Procedure(s) (LRB):  INSERTION RIGHT SACROILIAC SCREWS (Right)  WASHOUT (Left)  CLOSURE, WOUND (Left)    Post-Operative Day: 1 Day Post-Op  Subjective:     Principal Problem:Type I or II open fracture of right tibia and fibula    Principal Orthopedic Problem: 1 Day Post-Op   Right SI screw with Dr. Ferreira, POD2 Right tibia open injury IMN with Dr. Marquez    Interval History: Patient resting comfortably today. She is not elevated on a wedge. She has not been up to work with therapy. Has been refusing turns in bed per nursing staff. No acute complaints. Family bedside.    Review of patient's allergies indicates:   Allergen Reactions    Aspirin     Haldol [haloperidol lactate]        Current Facility-Administered Medications   Medication    0.9%  NaCl infusion (for blood administration)    0.9%  NaCl infusion    acetaminophen tablet 650 mg    bacitracin ointment    divalproex ER 24 hr tablet 500 mg    docusate sodium capsule 100 mg    famotidine tablet 20 mg    gabapentin capsule 300 mg    heparin, porcine (PF) 100 unit/mL injection flush 500 Units    hydrOXYzine pamoate capsule 25 mg    LORazepam tablet 1 mg    magnesium hydroxide 400 mg/5 ml suspension 2,400 mg    melatonin tablet 6 mg    methocarbamoL tablet 500 mg    mupirocin 2 % ointment    oxyCODONE immediate release tablet 5 mg    oxyCODONE immediate release tablet Tab 10 mg    polyethylene glycol packet 17 g    traZODone tablet 50 mg     Objective:     Vital Signs (Most Recent):  Temp: 98.6 °F (37 °C) (12/30/23 0700)  Pulse: (!) 111 (12/30/23 0700)  Resp: 18 (12/30/23 0605)  BP: 114/69 (12/30/23 0700)  SpO2: 97 % (12/30/23 0700) Vital Signs (24h Range):  Temp:  [96.8 °F (36 °C)-100.6 °F  "(38.1 °C)] 98.6 °F (37 °C)  Pulse:  [] 111  Resp:  [12-19] 18  SpO2:  [74 %-100 %] 97 %  BP: (100-140)/(55-90) 114/69     Weight: 68 kg (150 lb)  Height: 5' 3" (160 cm)  Body mass index is 26.57 kg/m².      Intake/Output Summary (Last 24 hours) at 12/30/2023 0957  Last data filed at 12/30/2023 0451  Gross per 24 hour   Intake 800 ml   Output 2075 ml   Net -1275 ml       Physical Exam:   General the patient is alert and oriented x3 no acute distress nontoxic-appearing appropriate affect.    Constitutional: Vital signs are examined and stable.  Resp: No signs of labored breathing                        RLE: -Skin: Dressing CDI, No signs of new abrasions or lacerations, no scars; Island dressing in place to right hip is clean and dry with minimal drainage           -MSK: : Hip and Knee F/E, EHL/FHL, Gastroc/Tib anterior Strength 5/5           -Neuro:  Sensation intact to light touch L3-S1 dermatomes           -Lymphatic: No signs of lymphadenopathy           -CV: Capillary refill is less than 2 seconds. DP/PT pulses  2/4. Compartments soft and compressible.        Diagnostic Findings:   Significant Labs:   Recent Lab Results  (Last 5 results in the past 72 hours)        12/30/23  0600   12/30/23  0008   12/29/23  1806   12/29/23  1236   12/29/23  0751        Immature Platelet Fraction   4.3   5.9           Albumin/Globulin Ratio 1.0         1.2       Albumin 2.3         2.5       ALP 46         43       ALT 27         36       Aniso     1+           AST 69         95       Baso # 0.02   0.01       0.04       Basophil % 0.2   0.1       0.4       BILIRUBIN TOTAL 0.3         0.3       BUN 7.5         8.6       Calcium 6.9  Comment: Critical Result called and verified by verbal readback to: Mikayla Lugo on 12/30/2023 at 06:54. Reported by [id number].         7.0       Chloride 108         106       CO2 22         23       Creatinine 0.55         0.57       eGFR >60         >60       Eos # 0.02   0.00       0.05       " Eosinophil % 0.2   0.0       0.5       Globulin, Total 2.3         2.1       Glucose 102         108       Gran # (ANC)     7.7792   10.2695         Hematocrit 22.1   21.2   20.4   29.1   27.2       Hemoglobin 7.2   7.2   6.6   9.1   8.9       Immature Grans (Abs) 0.07   0.05       0.04       Immature Granulocytes 0.7   0.6       0.4       Lymph # 1.67   1.25   0.6188   0.3243   1.29       LYMPH % 17.0   13.9       12.8       Lymphs %     7   3         Magnesium          2.00       MCH 29.0   29.4   28.9   28.9   29.2       MCHC 32.6   34.0   32.4   31.3   32.7       MCV 89.1   86.5   89.5   92.4   89.2       Microcytosis     1+           Mono # 0.90   0.81   0.442   0.2162   1.04       Mono % 9.2   9.0   5   2   10.3       MPV 11.1   10.7   10.5   10.6   10.9       Neut # 7.14   6.86       7.60       Neut % 72.7   76.4       75.6       Neutrophils Relative     88   95         nRBC 0.0   0.0   0.0   0.0   0.0       Phosphorus Level         2.4       Platelet Estimate     Decreased   Normal         Platelet Count 150   132   125   142   157       Poikilocytosis     2+           Potassium 4.1         4.5       Promyelocytes       1         PROTEIN TOTAL 4.6         4.6       RBC 2.48   2.45   2.28   3.15   3.05       RBC Morph     Abnormal   Normal         RDW 15.9   15.8   15.8   15.7   15.4       Sodium 137         134       Stomatocytes     2+           WBC 9.82   8.98   8.84   10.81   10.06            8.84   10.81                                 Significant Imaging: I have reviewed and interpreted all pertinent imaging results/findings.     Assessment/Plan:     Active Diagnoses:    Diagnosis Date Noted POA    PRINCIPAL PROBLEM:  Type I or II open fracture of right tibia and fibula [S82.201B, S82.401B] 12/28/2023 Yes    Contusion of right lung [S27.321A] 12/29/2023 Yes    Pelvic hematoma in female [N94.89] 12/29/2023 Yes    Closed fracture of transverse process of lumbar vertebra [S32.009A] 12/29/2023 Yes    Closed  fracture of spinous process of thoracic vertebra [S22.008A] 12/29/2023 Yes    Closed fracture of spinous process of lumbar vertebra [S32.009A] 12/29/2023 Yes    Closed bilateral fracture of pubic rami [S32.591A, S32.592A] 12/29/2023 Yes    Closed fracture of sacrum [S32.10XA] 12/29/2023 Yes    Laceration of buttock, left, initial encounter [S31.821A] 12/29/2023 Yes    Left ankle pain [M25.572] 12/28/2023 Yes      Problems Resolved During this Admission:   H&H is down slightly this morning. 2 units transfusino in process.  WB for stand to transfer only RLE. Will get lower extremity elevator today for Right tibia.   Daily dry dressing changes beginning tomorrow as needed   Lovenox for DVT ppx per primary team. Aspirin 81 mg BID upon discharge for DVT ppx   Continue  multimodal pain control  Will continue to monitor through the acute post operative period. Follow up with Dr. Ferreira in 2 weeks for wound check.    The above findings, diagnostics, and treatment plan were discussed with Dr. Galvan who is in agreement with the plan of care except as stated in additional documentation.      Tahmina Weiss PA-C  Orthopedic Trauma Surgery  [facility]
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
## 2023-12-30 NOTE — ANESTHESIA POSTPROCEDURE EVALUATION
Anesthesia Post Evaluation    Patient: Sandra Hernandez    Procedure(s) Performed: Procedure(s) (LRB):  INSERTION, INTRAMEDULLARY DELMI, TIBIA (Right)  INCISION AND DRAINAGE, LOWER EXTREMITY (Right)    Final Anesthesia Type: general      Patient location during evaluation: PACU  Patient participation: Yes- Able to Participate  Level of consciousness: awake  Post-procedure vital signs: reviewed and stable  Pain management: adequate  Airway patency: patent  GRISELDA mitigation strategies: Multimodal analgesia  PONV status at discharge: No PONV  Anesthetic complications: no      Cardiovascular status: stable  Respiratory status: unassisted  Hydration status: euvolemic  Follow-up not needed.              Vitals Value Taken Time   /69 12/30/23 0700   Temp 37 °C (98.6 °F) 12/30/23 0700   Pulse 111 12/30/23 0700   Resp 18 12/30/23 0605   SpO2 97 % 12/30/23 0700         Event Time   Out of Recovery 17:45:00         Pain/South Score: Pain Rating Prior to Med Admin: 7 (12/30/2023  6:05 AM)  Pain Rating Post Med Admin: 2 (12/30/2023  2:36 AM)  South Score: 9 (12/29/2023  1:08 PM)
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
## 2023-12-31 LAB
ALBUMIN SERPL-MCNC: 2 G/DL (ref 3.5–5)
ALBUMIN/GLOB SERPL: 0.8 RATIO (ref 1.1–2)
ALP SERPL-CCNC: 51 UNIT/L (ref 40–150)
ALT SERPL-CCNC: 20 UNIT/L (ref 0–55)
AST SERPL-CCNC: 47 UNIT/L (ref 5–34)
BASOPHILS # BLD AUTO: 0.05 X10(3)/MCL
BASOPHILS # BLD AUTO: 0.05 X10(3)/MCL
BASOPHILS NFR BLD AUTO: 0.5 %
BASOPHILS NFR BLD AUTO: 0.5 %
BILIRUB SERPL-MCNC: 0.4 MG/DL
BUN SERPL-MCNC: 6.3 MG/DL (ref 7–18.7)
CALCIUM SERPL-MCNC: 7.4 MG/DL (ref 8.4–10.2)
CHLORIDE SERPL-SCNC: 106 MMOL/L (ref 98–107)
CO2 SERPL-SCNC: 22 MMOL/L (ref 22–29)
CREAT SERPL-MCNC: 0.51 MG/DL (ref 0.55–1.02)
EOSINOPHIL # BLD AUTO: 0.07 X10(3)/MCL (ref 0–0.9)
EOSINOPHIL # BLD AUTO: 0.14 X10(3)/MCL (ref 0–0.9)
EOSINOPHIL NFR BLD AUTO: 0.7 %
EOSINOPHIL NFR BLD AUTO: 1.4 %
ERYTHROCYTE [DISTWIDTH] IN BLOOD BY AUTOMATED COUNT: 15.9 % (ref 11.5–17)
ERYTHROCYTE [DISTWIDTH] IN BLOOD BY AUTOMATED COUNT: 16 % (ref 11.5–17)
GFR SERPLBLD CREATININE-BSD FMLA CKD-EPI: >60 MLS/MIN/1.73/M2
GLOBULIN SER-MCNC: 2.4 GM/DL (ref 2.4–3.5)
GLUCOSE SERPL-MCNC: 111 MG/DL (ref 74–100)
HCT VFR BLD AUTO: 30.3 % (ref 37–47)
HCT VFR BLD AUTO: 30.3 % (ref 37–47)
HGB BLD-MCNC: 10 G/DL (ref 12–16)
HGB BLD-MCNC: 10.3 G/DL (ref 12–16)
IMM GRANULOCYTES # BLD AUTO: 0.1 X10(3)/MCL (ref 0–0.04)
IMM GRANULOCYTES # BLD AUTO: 0.11 X10(3)/MCL (ref 0–0.04)
IMM GRANULOCYTES NFR BLD AUTO: 1.1 %
IMM GRANULOCYTES NFR BLD AUTO: 1.1 %
LYMPHOCYTES # BLD AUTO: 1.01 X10(3)/MCL (ref 0.6–4.6)
LYMPHOCYTES # BLD AUTO: 1.49 X10(3)/MCL (ref 0.6–4.6)
LYMPHOCYTES NFR BLD AUTO: 10.8 %
LYMPHOCYTES NFR BLD AUTO: 14.9 %
MCH RBC QN AUTO: 28.9 PG (ref 27–31)
MCH RBC QN AUTO: 29.3 PG (ref 27–31)
MCHC RBC AUTO-ENTMCNC: 33 G/DL (ref 33–36)
MCHC RBC AUTO-ENTMCNC: 34 G/DL (ref 33–36)
MCV RBC AUTO: 86.3 FL (ref 80–94)
MCV RBC AUTO: 87.6 FL (ref 80–94)
MONOCYTES # BLD AUTO: 0.56 X10(3)/MCL (ref 0.1–1.3)
MONOCYTES # BLD AUTO: 0.68 X10(3)/MCL (ref 0.1–1.3)
MONOCYTES NFR BLD AUTO: 6 %
MONOCYTES NFR BLD AUTO: 6.8 %
NEUTROPHILS # BLD AUTO: 7.51 X10(3)/MCL (ref 2.1–9.2)
NEUTROPHILS # BLD AUTO: 7.55 X10(3)/MCL (ref 2.1–9.2)
NEUTROPHILS NFR BLD AUTO: 75.3 %
NEUTROPHILS NFR BLD AUTO: 80.9 %
NRBC BLD AUTO-RTO: 0 %
NRBC BLD AUTO-RTO: 0 %
PLATELET # BLD AUTO: 141 X10(3)/MCL (ref 130–400)
PLATELET # BLD AUTO: 142 X10(3)/MCL (ref 130–400)
PLATELETS.RETICULATED NFR BLD AUTO: 4.8 % (ref 0.9–11.2)
PMV BLD AUTO: 10.2 FL (ref 7.4–10.4)
PMV BLD AUTO: 10.7 FL (ref 7.4–10.4)
POTASSIUM SERPL-SCNC: 4 MMOL/L (ref 3.5–5.1)
PROT SERPL-MCNC: 4.4 GM/DL (ref 6.4–8.3)
RBC # BLD AUTO: 3.46 X10(6)/MCL (ref 4.2–5.4)
RBC # BLD AUTO: 3.51 X10(6)/MCL (ref 4.2–5.4)
SODIUM SERPL-SCNC: 134 MMOL/L (ref 136–145)
WBC # SPEC AUTO: 9.34 X10(3)/MCL (ref 4.5–11.5)
WBC # SPEC AUTO: 9.98 X10(3)/MCL (ref 4.5–11.5)

## 2023-12-31 PROCEDURE — 11000001 HC ACUTE MED/SURG PRIVATE ROOM

## 2023-12-31 PROCEDURE — 97163 PT EVAL HIGH COMPLEX 45 MIN: CPT

## 2023-12-31 PROCEDURE — 25000003 PHARM REV CODE 250

## 2023-12-31 PROCEDURE — 80053 COMPREHEN METABOLIC PANEL: CPT

## 2023-12-31 PROCEDURE — 85025 COMPLETE CBC W/AUTO DIFF WBC: CPT

## 2023-12-31 PROCEDURE — 63600175 PHARM REV CODE 636 W HCPCS

## 2023-12-31 RX ORDER — DRONABINOL 2.5 MG/1
2.5 CAPSULE ORAL 2 TIMES DAILY
Status: DISCONTINUED | OUTPATIENT
Start: 2023-12-31 | End: 2024-01-05 | Stop reason: HOSPADM

## 2023-12-31 RX ORDER — ENOXAPARIN SODIUM 100 MG/ML
40 INJECTION SUBCUTANEOUS EVERY 12 HOURS
Status: DISCONTINUED | OUTPATIENT
Start: 2023-12-31 | End: 2024-01-05 | Stop reason: HOSPADM

## 2023-12-31 RX ADMIN — ENOXAPARIN SODIUM 40 MG: 100 INJECTION SUBCUTANEOUS at 10:12

## 2023-12-31 RX ADMIN — FAMOTIDINE 20 MG: 20 TABLET ORAL at 10:12

## 2023-12-31 RX ADMIN — SODIUM CHLORIDE: 9 INJECTION, SOLUTION INTRAVENOUS at 05:12

## 2023-12-31 RX ADMIN — ACETAMINOPHEN 325MG 650 MG: 325 TABLET ORAL at 05:12

## 2023-12-31 RX ADMIN — ENOXAPARIN SODIUM 40 MG: 100 INJECTION SUBCUTANEOUS at 09:12

## 2023-12-31 RX ADMIN — OXYCODONE HYDROCHLORIDE 10 MG: 10 TABLET ORAL at 05:12

## 2023-12-31 RX ADMIN — HYDROMORPHONE HYDROCHLORIDE 0.2 MG: 2 INJECTION INTRAMUSCULAR; INTRAVENOUS; SUBCUTANEOUS at 03:12

## 2023-12-31 RX ADMIN — POLYETHYLENE GLYCOL 3350 17 G: 17 POWDER, FOR SOLUTION ORAL at 10:12

## 2023-12-31 RX ADMIN — OXYCODONE HYDROCHLORIDE 10 MG: 10 TABLET ORAL at 10:12

## 2023-12-31 RX ADMIN — DRONABINOL 2.5 MG: 2.5 CAPSULE ORAL at 10:12

## 2023-12-31 RX ADMIN — FAMOTIDINE 20 MG: 20 TABLET ORAL at 09:12

## 2023-12-31 RX ADMIN — METHOCARBAMOL 500 MG: 500 TABLET ORAL at 09:12

## 2023-12-31 RX ADMIN — ACETAMINOPHEN 325MG 650 MG: 325 TABLET ORAL at 09:12

## 2023-12-31 RX ADMIN — ACETAMINOPHEN 325MG 650 MG: 325 TABLET ORAL at 06:12

## 2023-12-31 RX ADMIN — GABAPENTIN 300 MG: 300 CAPSULE ORAL at 09:12

## 2023-12-31 RX ADMIN — METHOCARBAMOL 500 MG: 500 TABLET ORAL at 05:12

## 2023-12-31 RX ADMIN — DOCUSATE SODIUM 100 MG: 100 CAPSULE, LIQUID FILLED ORAL at 10:12

## 2023-12-31 RX ADMIN — OXYCODONE HYDROCHLORIDE 5 MG: 5 TABLET ORAL at 09:12

## 2023-12-31 RX ADMIN — HYDROXYZINE PAMOATE 25 MG: 25 CAPSULE ORAL at 05:12

## 2023-12-31 RX ADMIN — POLYETHYLENE GLYCOL 3350 17 G: 17 POWDER, FOR SOLUTION ORAL at 09:12

## 2023-12-31 RX ADMIN — DOCUSATE SODIUM 100 MG: 100 CAPSULE, LIQUID FILLED ORAL at 09:12

## 2023-12-31 RX ADMIN — GABAPENTIN 300 MG: 300 CAPSULE ORAL at 10:12

## 2023-12-31 RX ADMIN — DRONABINOL 2.5 MG: 2.5 CAPSULE ORAL at 09:12

## 2023-12-31 RX ADMIN — DIVALPROEX SODIUM 500 MG: 500 TABLET, FILM COATED, EXTENDED RELEASE ORAL at 10:12

## 2023-12-31 RX ADMIN — TRAZODONE HYDROCHLORIDE 50 MG: 50 TABLET ORAL at 09:12

## 2023-12-31 RX ADMIN — DIVALPROEX SODIUM 500 MG: 500 TABLET, FILM COATED, EXTENDED RELEASE ORAL at 09:12

## 2023-12-31 RX ADMIN — BACITRACIN: 500 OINTMENT TOPICAL at 10:12

## 2023-12-31 RX ADMIN — ACETAMINOPHEN 325MG 650 MG: 325 TABLET ORAL at 10:12

## 2023-12-31 RX ADMIN — OXYCODONE HYDROCHLORIDE 10 MG: 10 TABLET ORAL at 03:12

## 2023-12-31 NOTE — PLAN OF CARE
Problem: Physical Therapy  Goal: Physical Therapy Goal  Description: Goals to be met by: 24     Patient will increase functional independence with mobility by performin. Supine to sit with Set-up [place]  2. Sit to supine with Set-up [place]  3. Rolling to Left and Right with Set-up Assistance.  4. Sit to stand transfer with Stand-by Assistance  5. Bed to chair transfer with Stand-by Assistance using Rolling Walker  6. Wheelchair propulsion x300 feet with [place] using bilateral uppper extremities    Outcome: Ongoing, Progressing
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
## 2023-12-31 NOTE — PROGRESS NOTES
[facility] - [facility]  Orthopedics  Progress Note    Patient Name: Sandra Hernandez  MRN: [id number]  Admission Date: 12/28/2023  Hospital Length of Stay: 3 days  Attending Provider: Alfonso Cohen Jr., *  Primary Care Provider: Marc Christy MD  Follow-up For: Procedure(s) (LRB):  INSERTION RIGHT SACROILIAC SCREWS (Right)  WASHOUT (Left)  CLOSURE, WOUND (Left)    Post-Operative Day: 2 Day Post-Op  Subjective:     Principal Problem:Type I or II open fracture of right tibia and fibula    Principal Orthopedic Problem: 2 Days Post-Op   Right SI screw with Dr. Ferreira, POD3 Right tibia open injury IMN with Dr. Marquez    Interval History: Patient resting comfortably today. She is elevated on wedge today. Family present states this has helped some with her pain.  No acute complaints. Family bedside. Dressings changed today. Did have elevated temp not consistent with fever overnight.     Review of patient's allergies indicates:   Allergen Reactions    Aspirin     Haldol [haloperidol lactate]        Current Facility-Administered Medications   Medication    0.9%  NaCl infusion (for blood administration)    0.9%  NaCl infusion    acetaminophen tablet 650 mg    bacitracin ointment    divalproex ER 24 hr tablet 500 mg    docusate sodium capsule 100 mg    droNABinol capsule 2.5 mg    enoxaparin injection 40 mg    famotidine tablet 20 mg    gabapentin capsule 300 mg    heparin, porcine (PF) 100 unit/mL injection flush 500 Units    HYDROmorphone (PF) injection 0.2 mg    hydrOXYzine pamoate capsule 25 mg    LORazepam tablet 1 mg    magnesium hydroxide 400 mg/5 ml suspension 2,400 mg    melatonin tablet 6 mg    methocarbamoL tablet 500 mg    mupirocin 2 % ointment    oxyCODONE immediate release tablet 5 mg    oxyCODONE immediate release tablet Tab 10 mg    polyethylene glycol packet 17 g    simethicone chewable tablet 80 mg    traZODone tablet 50 mg     Objective:     Vital Signs (Most Recent):  Temp: 99.1 °F (37.3 °C)  "(12/31/23 0739)  Pulse: (!) 121 (12/31/23 0739)  Resp: 18 (12/31/23 1005)  BP: 116/67 (12/31/23 0739)  SpO2: (!) 91 % (12/31/23 0739) Vital Signs (24h Range):  Temp:  [98.1 °F (36.7 °C)-100.1 °F (37.8 °C)] 99.1 °F (37.3 °C)  Pulse:  [] 121  Resp:  [15-18] 18  SpO2:  [90 %-100 %] 91 %  BP: (112-125)/(65-76) 116/67     Weight: 68 kg (150 lb)  Height: 5' 3" (160 cm)  Body mass index is 26.57 kg/m².      Intake/Output Summary (Last 24 hours) at 12/31/2023 1008  Last data filed at 12/31/2023 0453  Gross per 24 hour   Intake 674.58 ml   Output 925 ml   Net -250.42 ml         Physical Exam:   General the patient is alert and oriented x3 no acute distress nontoxic-appearing appropriate affect.    Constitutional: Vital signs are examined and stable.  Resp: No signs of labored breathing                        RLE: -Skin: Dressing CDI, No signs of new abrasions or lacerations, no scars; Dressings removed and replaced with islands. Incisions clean and dry without erythema, no evidence of infection. Minimal sanguinous drainage.           -MSK: : Hip and Knee F/E, EHL/FHL, Gastroc/Tib anterior Strength 5/5           -Neuro:  Sensation intact to light touch L3-S1 dermatomes           -Lymphatic: No signs of lymphadenopathy           -CV: Capillary refill is less than 2 seconds. DP/PT pulses  2/4. Compartments soft and compressible.        Diagnostic Findings:   Significant Labs:   Recent Lab Results  (Last 5 results in the past 72 hours)        12/31/23  0828   12/31/23  0015   12/30/23  1445   12/30/23  0600   12/30/23  0008        Immature Platelet Fraction 4.8     5.4     4.3       Albumin/Globulin Ratio 0.8       1.0         Albumin 2.0       2.3         ALP 51       46         ALT 20       27         AST 47       69         Baso # 0.05   0.05   0.03   0.02   0.01       Basophil % 0.5   0.5   0.4   0.2   0.1       BILIRUBIN TOTAL 0.4       0.3         BUN 6.3       7.5         Calcium 7.4       6.9  Comment: Critical " Result called and verified by verbal readback to: Mikayla Lugo on 12/30/2023 at 06:54. Reported by [id number].         Chloride 106       108         CO2 22       22         Creatinine 0.51       0.55         eGFR >60       >60         Eos # 0.07   0.14   0.08   0.02   0.00       Eosinophil % 0.7   1.4   0.9   0.2   0.0       Globulin, Total 2.4       2.3         Glucose 111       102         Hematocrit 30.3   30.3   26.6   22.1   21.2       Hemoglobin 10.3   10.0   8.4   7.2   7.2       Immature Grans (Abs) 0.10   0.11   0.09   0.07   0.05       Immature Granulocytes 1.1   1.1   1.1   0.7   0.6       Lactate, Pedro     1.0           Lymph # 1.01   1.49   1.83   1.67   1.25       LYMPH % 10.8   14.9   21.7   17.0   13.9       MCH 29.3   28.9   28.2   29.0   29.4       MCHC 34.0   33.0   31.6   32.6   34.0       MCV 86.3   87.6   89.3   89.1   86.5       Mono # 0.56   0.68   0.67   0.90   0.81       Mono % 6.0   6.8   7.9   9.2   9.0       MPV 10.2   10.7   10.5   11.1   10.7       Neut # 7.55   7.51   5.75   7.14   6.86       Neut % 80.9   75.3   68.0   72.7   76.4       nRBC 0.0   0.0   0.0   0.0   0.0       Platelet Count 141   142   141   150   132       Potassium 4.0       4.1         PROTEIN TOTAL 4.4       4.6         RBC 3.51   3.46   2.98   2.48   2.45       RDW 16.0   15.9   15.8   15.9   15.8       Sodium 134       137         WBC 9.34   9.98   8.45   9.82   8.98                               Significant Imaging: I have reviewed and interpreted all pertinent imaging results/findings.     Assessment/Plan:     Active Diagnoses:    Diagnosis Date Noted POA    PRINCIPAL PROBLEM:  Type I or II open fracture of right tibia and fibula [S82.201B, S82.401B] 12/28/2023 Yes    Contusion of right lung [S27.321A] 12/29/2023 Yes    Pelvic hematoma in female [N94.89] 12/29/2023 Yes    Closed fracture of transverse process of lumbar vertebra [S32.009A] 12/29/2023 Yes    Closed fracture of spinous process of thoracic vertebra  [S22.008A] 12/29/2023 Yes    Closed fracture of spinous process of lumbar vertebra [S32.009A] 12/29/2023 Yes    Closed bilateral fracture of pubic rami [S32.591A, S32.592A] 12/29/2023 Yes    Closed fracture of sacrum [S32.10XA] 12/29/2023 Yes    Laceration of buttock, left, initial encounter [S31.821A] 12/29/2023 Yes    Left ankle pain [M25.572] 12/28/2023 Yes      Problems Resolved During this Admission:   H&H stable this morning.   WB for stand to transfer only RLE. Will get lower extremity elevator today for Right tibia.   Daily dry dressing changes beginning today as needed   Lovenox for DVT ppx per primary team. Aspirin 81 mg BID upon discharge for DVT ppx  Continue multimodal pain control  Will continue to monitor through the acute post operative period. Follow up with Dr. Ferreira in 2 weeks for wound check. Please call with any questions or concerns.    The above findings, diagnostics, and treatment plan were discussed with Dr. Galvan who is in agreement with the plan of care except as stated in additional documentation.      Tahmina Weiss PA-C  Orthopedic Trauma Surgery  [facility]

## 2024-01-01 ENCOUNTER — ANESTHESIA EVENT (OUTPATIENT)
Dept: SURGERY | Facility: HOSPITAL | Age: 24
DRG: 958 | End: 2024-01-01
Payer: MEDICAID

## 2024-01-01 LAB
ABS NEUT (OLG): 11.72 X10(3)/MCL (ref 2.1–9.2)
ALBUMIN SERPL-MCNC: 1.6 G/DL (ref 3.5–5)
ALBUMIN/GLOB SERPL: 0.5 RATIO (ref 1.1–2)
ALP SERPL-CCNC: 64 UNIT/L (ref 40–150)
ALT SERPL-CCNC: 16 UNIT/L (ref 0–55)
APPEARANCE UR: CLEAR
AST SERPL-CCNC: 33 UNIT/L (ref 5–34)
BACTERIA #/AREA URNS AUTO: ABNORMAL /HPF
BILIRUB SERPL-MCNC: 0.4 MG/DL
BILIRUB UR QL STRIP.AUTO: NEGATIVE
BUN SERPL-MCNC: 5.1 MG/DL (ref 7–18.7)
CALCIUM SERPL-MCNC: 7.7 MG/DL (ref 8.4–10.2)
CHLORIDE SERPL-SCNC: 105 MMOL/L (ref 98–107)
CO2 SERPL-SCNC: 23 MMOL/L (ref 22–29)
COLOR UR AUTO: YELLOW
CREAT SERPL-MCNC: 0.47 MG/DL (ref 0.55–1.02)
CRP SERPL-MCNC: 325.2 MG/L
EOSINOPHIL NFR BLD MANUAL: 0.3 X10(3)/MCL (ref 0–0.9)
EOSINOPHIL NFR BLD MANUAL: 2 %
ERYTHROCYTE [DISTWIDTH] IN BLOOD BY AUTOMATED COUNT: 16.2 % (ref 11.5–17)
GFR SERPLBLD CREATININE-BSD FMLA CKD-EPI: >60 MLS/MIN/1.73/M2
GLOBULIN SER-MCNC: 3.1 GM/DL (ref 2.4–3.5)
GLUCOSE SERPL-MCNC: 109 MG/DL (ref 74–100)
GLUCOSE UR QL STRIP.AUTO: ABNORMAL
HCT VFR BLD AUTO: 28.8 % (ref 37–47)
HGB BLD-MCNC: 9.8 G/DL (ref 12–16)
INSTRUMENT WBC (OLG): 15.03 X10(3)/MCL
KETONES UR QL STRIP.AUTO: ABNORMAL
LEUKOCYTE ESTERASE UR QL STRIP.AUTO: 25
LYMPHOCYTES NFR BLD MANUAL: 1.65 X10(3)/MCL
LYMPHOCYTES NFR BLD MANUAL: 11 %
MACROCYTES BLD QL SMEAR: ABNORMAL
MCH RBC QN AUTO: 29 PG (ref 27–31)
MCHC RBC AUTO-ENTMCNC: 34 G/DL (ref 33–36)
MCV RBC AUTO: 85.2 FL (ref 80–94)
METAMYELOCYTES NFR BLD MANUAL: 3 %
MONOCYTES NFR BLD MANUAL: 0.9 X10(3)/MCL (ref 0.1–1.3)
MONOCYTES NFR BLD MANUAL: 6 %
MUCOUS THREADS URNS QL MICRO: ABNORMAL /LPF
MYELOCYTES NFR BLD MANUAL: 1 %
NEUTROPHILS NFR BLD MANUAL: 78 %
NITRITE UR QL STRIP.AUTO: NEGATIVE
NRBC BLD AUTO-RTO: 0 %
PH UR STRIP.AUTO: 6 [PH]
PLATELET # BLD AUTO: 163 X10(3)/MCL (ref 130–400)
PLATELET # BLD EST: NORMAL 10*3/UL
PMV BLD AUTO: 10.3 FL (ref 7.4–10.4)
POIKILOCYTOSIS BLD QL SMEAR: ABNORMAL
POLYCHROMASIA BLD QL SMEAR: ABNORMAL
POTASSIUM SERPL-SCNC: 3.8 MMOL/L (ref 3.5–5.1)
PREALB SERPL-MCNC: 6.8 MG/DL (ref 16–38)
PROT SERPL-MCNC: 4.7 GM/DL (ref 6.4–8.3)
PROT UR QL STRIP.AUTO: ABNORMAL
RBC # BLD AUTO: 3.38 X10(6)/MCL (ref 4.2–5.4)
RBC #/AREA URNS AUTO: ABNORMAL /HPF
RBC MORPH BLD: ABNORMAL
RBC UR QL AUTO: ABNORMAL
SODIUM SERPL-SCNC: 132 MMOL/L (ref 136–145)
SP GR UR STRIP.AUTO: 1.04 (ref 1–1.03)
SQUAMOUS #/AREA URNS LPF: ABNORMAL /HPF
TARGETS BLD QL SMEAR: ABNORMAL
UROBILINOGEN UR STRIP-ACNC: 4
WBC # SPEC AUTO: 15.03 X10(3)/MCL (ref 4.5–11.5)
WBC #/AREA URNS AUTO: ABNORMAL /HPF

## 2024-01-01 PROCEDURE — 86140 C-REACTIVE PROTEIN: CPT

## 2024-01-01 PROCEDURE — 80053 COMPREHEN METABOLIC PANEL: CPT

## 2024-01-01 PROCEDURE — 87086 URINE CULTURE/COLONY COUNT: CPT

## 2024-01-01 PROCEDURE — S4991 NICOTINE PATCH NONLEGEND: HCPCS | Performed by: NURSE PRACTITIONER

## 2024-01-01 PROCEDURE — 87040 BLOOD CULTURE FOR BACTERIA: CPT

## 2024-01-01 PROCEDURE — 81001 URINALYSIS AUTO W/SCOPE: CPT

## 2024-01-01 PROCEDURE — 25000003 PHARM REV CODE 250

## 2024-01-01 PROCEDURE — 84134 ASSAY OF PREALBUMIN: CPT

## 2024-01-01 PROCEDURE — 63600175 PHARM REV CODE 636 W HCPCS

## 2024-01-01 PROCEDURE — 11000001 HC ACUTE MED/SURG PRIVATE ROOM

## 2024-01-01 PROCEDURE — 94664 DEMO&/EVAL PT USE INHALER: CPT

## 2024-01-01 PROCEDURE — 93005 ELECTROCARDIOGRAM TRACING: CPT | Performed by: INTERNAL MEDICINE

## 2024-01-01 PROCEDURE — 93005 ELECTROCARDIOGRAM TRACING: CPT

## 2024-01-01 PROCEDURE — 25000003 PHARM REV CODE 250: Performed by: SURGERY

## 2024-01-01 PROCEDURE — 85027 COMPLETE CBC AUTOMATED: CPT

## 2024-01-01 PROCEDURE — 94640 AIRWAY INHALATION TREATMENT: CPT

## 2024-01-01 PROCEDURE — 99232 SBSQ HOSP IP/OBS MODERATE 35: CPT | Mod: ,,, | Performed by: SURGERY

## 2024-01-01 PROCEDURE — 25000242 PHARM REV CODE 250 ALT 637 W/ HCPCS

## 2024-01-01 PROCEDURE — 63600175 PHARM REV CODE 636 W HCPCS: Performed by: SURGERY

## 2024-01-01 PROCEDURE — 25000003 PHARM REV CODE 250: Performed by: NURSE PRACTITIONER

## 2024-01-01 PROCEDURE — 25500020 PHARM REV CODE 255: Performed by: SURGERY

## 2024-01-01 PROCEDURE — 94761 N-INVAS EAR/PLS OXIMETRY MLT: CPT

## 2024-01-01 PROCEDURE — 99900035 HC TECH TIME PER 15 MIN (STAT)

## 2024-01-01 PROCEDURE — 87070 CULTURE OTHR SPECIMN AEROBIC: CPT

## 2024-01-01 PROCEDURE — 27000221 HC OXYGEN, UP TO 24 HOURS

## 2024-01-01 RX ORDER — FUROSEMIDE 20 MG/1
20 TABLET ORAL ONCE
Status: COMPLETED | OUTPATIENT
Start: 2024-01-01 | End: 2024-01-01

## 2024-01-01 RX ORDER — IPRATROPIUM BROMIDE AND ALBUTEROL SULFATE 2.5; .5 MG/3ML; MG/3ML
3 SOLUTION RESPIRATORY (INHALATION) EVERY 6 HOURS
Status: DISCONTINUED | OUTPATIENT
Start: 2024-01-01 | End: 2024-01-01

## 2024-01-01 RX ORDER — IBUPROFEN 200 MG
1 TABLET ORAL DAILY
Status: DISCONTINUED | OUTPATIENT
Start: 2024-01-01 | End: 2024-01-05 | Stop reason: HOSPADM

## 2024-01-01 RX ORDER — VANCOMYCIN HCL IN 5 % DEXTROSE 1G/250ML
1000 PLASTIC BAG, INJECTION (ML) INTRAVENOUS
Status: DISCONTINUED | OUTPATIENT
Start: 2024-01-01 | End: 2024-01-02

## 2024-01-01 RX ORDER — IBUPROFEN 200 MG
1 TABLET ORAL DAILY
Status: DISCONTINUED | OUTPATIENT
Start: 2024-01-02 | End: 2024-01-01

## 2024-01-01 RX ORDER — METHOCARBAMOL 500 MG/1
500 TABLET, FILM COATED ORAL 4 TIMES DAILY
Status: DISCONTINUED | OUTPATIENT
Start: 2024-01-02 | End: 2024-01-05 | Stop reason: HOSPADM

## 2024-01-01 RX ORDER — GABAPENTIN 400 MG/1
400 CAPSULE ORAL 3 TIMES DAILY
Status: DISCONTINUED | OUTPATIENT
Start: 2024-01-02 | End: 2024-01-05 | Stop reason: HOSPADM

## 2024-01-01 RX ORDER — LIDOCAINE 40 MG/G
CREAM TOPICAL 3 TIMES DAILY PRN
Status: DISCONTINUED | OUTPATIENT
Start: 2024-01-01 | End: 2024-01-05 | Stop reason: HOSPADM

## 2024-01-01 RX ADMIN — OXYCODONE HYDROCHLORIDE 10 MG: 10 TABLET ORAL at 10:01

## 2024-01-01 RX ADMIN — DOCUSATE SODIUM 100 MG: 100 CAPSULE, LIQUID FILLED ORAL at 09:01

## 2024-01-01 RX ADMIN — POLYETHYLENE GLYCOL 3350 17 G: 17 POWDER, FOR SOLUTION ORAL at 09:01

## 2024-01-01 RX ADMIN — VANCOMYCIN HYDROCHLORIDE 1000 MG: 1 INJECTION, POWDER, LYOPHILIZED, FOR SOLUTION INTRAVENOUS at 04:01

## 2024-01-01 RX ADMIN — MUPIROCIN: 20 OINTMENT TOPICAL at 10:01

## 2024-01-01 RX ADMIN — METHOCARBAMOL 500 MG: 500 TABLET ORAL at 05:01

## 2024-01-01 RX ADMIN — MUPIROCIN: 20 OINTMENT TOPICAL at 09:01

## 2024-01-01 RX ADMIN — IPRATROPIUM BROMIDE AND ALBUTEROL SULFATE 3 ML: 2.5; .5 SOLUTION RESPIRATORY (INHALATION) at 07:01

## 2024-01-01 RX ADMIN — ACETAMINOPHEN 325MG 650 MG: 325 TABLET ORAL at 02:01

## 2024-01-01 RX ADMIN — OXYCODONE HYDROCHLORIDE 10 MG: 10 TABLET ORAL at 04:01

## 2024-01-01 RX ADMIN — DOCUSATE SODIUM 100 MG: 100 CAPSULE, LIQUID FILLED ORAL at 10:01

## 2024-01-01 RX ADMIN — FAMOTIDINE 20 MG: 20 TABLET ORAL at 10:01

## 2024-01-01 RX ADMIN — ACETAMINOPHEN 325MG 650 MG: 325 TABLET ORAL at 06:01

## 2024-01-01 RX ADMIN — DIVALPROEX SODIUM 500 MG: 500 TABLET, FILM COATED, EXTENDED RELEASE ORAL at 09:01

## 2024-01-01 RX ADMIN — BACITRACIN: 500 OINTMENT TOPICAL at 09:01

## 2024-01-01 RX ADMIN — IPRATROPIUM BROMIDE AND ALBUTEROL SULFATE 3 ML: 2.5; .5 SOLUTION RESPIRATORY (INHALATION) at 01:01

## 2024-01-01 RX ADMIN — GABAPENTIN 300 MG: 300 CAPSULE ORAL at 10:01

## 2024-01-01 RX ADMIN — OXYCODONE HYDROCHLORIDE 5 MG: 5 TABLET ORAL at 09:01

## 2024-01-01 RX ADMIN — METHOCARBAMOL 500 MG: 500 TABLET ORAL at 09:01

## 2024-01-01 RX ADMIN — POLYETHYLENE GLYCOL 3350 17 G: 17 POWDER, FOR SOLUTION ORAL at 10:01

## 2024-01-01 RX ADMIN — BACITRACIN: 500 OINTMENT TOPICAL at 11:01

## 2024-01-01 RX ADMIN — DIVALPROEX SODIUM 500 MG: 500 TABLET, FILM COATED, EXTENDED RELEASE ORAL at 10:01

## 2024-01-01 RX ADMIN — DRONABINOL 2.5 MG: 2.5 CAPSULE ORAL at 10:01

## 2024-01-01 RX ADMIN — NICOTINE 1 PATCH: 14 PATCH TRANSDERMAL at 08:01

## 2024-01-01 RX ADMIN — SODIUM CHLORIDE 250 ML: 9 INJECTION, SOLUTION INTRAVENOUS at 10:01

## 2024-01-01 RX ADMIN — IOPAMIDOL 100 ML: 755 INJECTION, SOLUTION INTRAVENOUS at 12:01

## 2024-01-01 RX ADMIN — BACITRACIN: 500 OINTMENT TOPICAL at 02:01

## 2024-01-01 RX ADMIN — ACETAMINOPHEN 325MG 650 MG: 325 TABLET ORAL at 09:01

## 2024-01-01 RX ADMIN — VANCOMYCIN HYDROCHLORIDE 1000 MG: 1 INJECTION, POWDER, LYOPHILIZED, FOR SOLUTION INTRAVENOUS at 10:01

## 2024-01-01 RX ADMIN — TRAZODONE HYDROCHLORIDE 50 MG: 50 TABLET ORAL at 09:01

## 2024-01-01 RX ADMIN — SODIUM CHLORIDE: 9 INJECTION, SOLUTION INTRAVENOUS at 12:01

## 2024-01-01 RX ADMIN — DRONABINOL 2.5 MG: 2.5 CAPSULE ORAL at 09:01

## 2024-01-01 RX ADMIN — FAMOTIDINE 20 MG: 20 TABLET ORAL at 09:01

## 2024-01-01 RX ADMIN — OXYCODONE HYDROCHLORIDE 10 MG: 10 TABLET ORAL at 05:01

## 2024-01-01 RX ADMIN — METHOCARBAMOL 500 MG: 500 TABLET ORAL at 02:01

## 2024-01-01 RX ADMIN — ENOXAPARIN SODIUM 40 MG: 100 INJECTION SUBCUTANEOUS at 09:01

## 2024-01-01 RX ADMIN — ENOXAPARIN SODIUM 40 MG: 100 INJECTION SUBCUTANEOUS at 11:01

## 2024-01-01 RX ADMIN — CEFEPIME 2 G: 2 INJECTION, POWDER, FOR SOLUTION INTRAVENOUS at 02:01

## 2024-01-01 RX ADMIN — GABAPENTIN 300 MG: 300 CAPSULE ORAL at 09:01

## 2024-01-01 RX ADMIN — GABAPENTIN 300 MG: 300 CAPSULE ORAL at 02:01

## 2024-01-01 RX ADMIN — FUROSEMIDE 20 MG: 20 TABLET ORAL at 10:01

## 2024-01-01 NOTE — ANESTHESIA POSTPROCEDURE EVALUATION
Anesthesia Post Evaluation    Patient: Sandra Hernandez    Procedure(s) Performed: Procedure(s) (LRB):  INSERTION RIGHT SACROILIAC SCREWS (Right)  WASHOUT (Left)  CLOSURE, WOUND (Left)    Final Anesthesia Type: general      Patient location during evaluation: PACU  Patient participation: Yes- Able to Participate  Level of consciousness: awake  Post-procedure vital signs: reviewed and stable  Pain management: adequate  Airway patency: patent    PONV status at discharge: vomiting (controlled) and nausea (controlled)  Anesthetic complications: no      Cardiovascular status: hemodynamically stable  Respiratory status: spontaneous ventilation and unassisted  Hydration status: euvolemic  Follow-up not needed.  Comments:                  Vitals Value Taken Time   /72 01/01/24 0758   Temp 37.9 °C (100.2 °F) 01/01/24 0758   Pulse 128 01/01/24 0758   Resp 19 01/01/24 0758   SpO2 92 % 01/01/24 0758         Event Time   Out of Recovery 12/29/2023 13:08:11         Pain/South Score: Pain Rating Prior to Med Admin: 7 (1/1/2024  5:19 AM)  Pain Rating Post Med Admin: 5 (12/31/2023 10:42 PM)

## 2024-01-01 NOTE — PLAN OF CARE
Reviewed CT  Fluid collection concerning  Spoke with ortho.    Will plan combo procedure in AM for I+D/washout  Broad spectrum abx and NPO p MN

## 2024-01-01 NOTE — PROGRESS NOTES
Pharmacokinetic Initial Assessment: IV Vancomycin    Assessment/Plan:    Initiate intravenous vancomycin with 1000 mg IV every 8 hours  Desired empiric serum trough concentration is 15 to 20 mcg/mL  Draw vancomycin trough level 60 min prior to fourth dose on 01/02 at approximately 1400  Pharmacy will continue to follow and monitor vancomycin.      Please contact pharmacy at extension [phone] with any questions regarding this assessment.     Thank you for the consult,   Samym Smith       Patient brief summary:  Sandra Hernandez is a 23 y.o. female initiated on antimicrobial therapy with IV Vancomycin for treatment of suspected skin & soft tissue infection    Drug Allergies:   Review of patient's allergies indicates:   Allergen Reactions    Aspirin     Haldol [haloperidol lactate]        Actual Body Weight:   68 kg    Renal Function:   Estimated Creatinine Clearance: 172.2 mL/min (A) (based on SCr of 0.47 mg/dL (L)).,     Dialysis Method (if applicable):  N/A    CBC (last 72 hours):  Recent Labs   Lab Result Units 12/29/23  1806 12/30/23  0008 12/30/23  0600 12/30/23  1445 12/31/23  0015 12/31/23  0828 01/01/24  0616   WBC x10(3)/mcL 8.84  8.84 8.98 9.82 8.45 9.98 9.34 15.03  15.03*   Hgb g/dL 6.6* 7.2* 7.2* 8.4* 10.0* 10.3* 9.8*   Hct % 20.4* 21.2* 22.1* 26.6* 30.3* 30.3* 28.8*   Platelet x10(3)/mcL 125* 132 150 141 142 141 163   Mono % %  --  9.0 9.2 7.9 6.8 6.0  --    Monocytes % % 5  --   --   --   --   --  6   Eosinophils % %  --   --   --   --   --   --  2   Eos % %  --  0.0 0.2 0.9 1.4 0.7  --    Basophil % %  --  0.1 0.2 0.4 0.5 0.5  --        Metabolic Panel (last 72 hours):  Recent Labs   Lab Result Units 12/30/23  0600 12/31/23  0828 01/01/24  0616   Sodium Level mmol/L 137 134* 132*   Potassium Level mmol/L 4.1 4.0 3.8   Chloride mmol/L 108* 106 105   Carbon Dioxide mmol/L 22 22 23   Glucose Level mg/dL 102* 111* 109*   Blood Urea Nitrogen mg/dL 7.5 6.3* 5.1*   Creatinine mg/dL 0.55 0.51* 0.47*   Albumin Level  "g/dL 2.3* 2.0* 1.6*   Bilirubin Total mg/dL 0.3 0.4 0.4   Alkaline Phosphatase unit/L 46 51 64   Aspartate Aminotransferase unit/L 69* 47* 33   Alanine Aminotransferase unit/L 27 20 16       Drug levels (last 3 results):  No results for input(s): "VANCOMYCINRA", "VANCORANDOM", "VANCOMYCINPE", "VANCOPEAK", "VANCOMYCINTR", "VANCOTROUGH" in the last 72 hours.    Microbiologic Results:  Microbiology Results (last 7 days)       ** No results found for the last 168 hours. **            "
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
## 2024-01-01 NOTE — NURSING
Patient called to be placed on the bedpan, CNA present in the room, when CNA was told that we would have to change the pads pt refused stated that she wanted to wait until the am; advised patient that we could place her on the bed pan so that she would not have to hold her stool, she stated she wanted to go to sleep; told Cna to pass on in report that when patient has a bath everything could be changed at that point. Patient stated that is what she wanted to do, she just wants to go back to sleep now, she states that it will hurt to much to do all of that

## 2024-01-02 ENCOUNTER — ANESTHESIA (OUTPATIENT)
Dept: SURGERY | Facility: HOSPITAL | Age: 24
DRG: 958 | End: 2024-01-02
Payer: MEDICAID

## 2024-01-02 LAB
ABS NEUT (OLG): 9.37 X10(3)/MCL (ref 2.1–9.2)
ALBUMIN SERPL-MCNC: 1.5 G/DL (ref 3.5–5)
ALBUMIN/GLOB SERPL: 0.5 RATIO (ref 1.1–2)
ALP SERPL-CCNC: 82 UNIT/L (ref 40–150)
ALT SERPL-CCNC: 17 UNIT/L (ref 0–55)
ANISOCYTOSIS BLD QL SMEAR: ABNORMAL
AST SERPL-CCNC: 30 UNIT/L (ref 5–34)
BILIRUB SERPL-MCNC: 0.7 MG/DL
BUN SERPL-MCNC: 6.8 MG/DL (ref 7–18.7)
CALCIUM SERPL-MCNC: 7.4 MG/DL (ref 8.4–10.2)
CHLORIDE SERPL-SCNC: 104 MMOL/L (ref 98–107)
CO2 SERPL-SCNC: 21 MMOL/L (ref 22–29)
CREAT SERPL-MCNC: 0.47 MG/DL (ref 0.55–1.02)
CRP SERPL-MCNC: 366.7 MG/L
ERYTHROCYTE [DISTWIDTH] IN BLOOD BY AUTOMATED COUNT: 16.1 % (ref 11.5–17)
GFR SERPLBLD CREATININE-BSD FMLA CKD-EPI: >60 MLS/MIN/1.73/M2
GLOBULIN SER-MCNC: 2.8 GM/DL (ref 2.4–3.5)
GLUCOSE SERPL-MCNC: 129 MG/DL (ref 74–100)
HCT VFR BLD AUTO: 30.2 % (ref 37–47)
HGB BLD-MCNC: 10 G/DL (ref 12–16)
INSTRUMENT WBC (OLG): 11.16 X10(3)/MCL
LYMPHOCYTES NFR BLD MANUAL: 0.89 X10(3)/MCL
LYMPHOCYTES NFR BLD MANUAL: 8 %
MACROCYTES BLD QL SMEAR: ABNORMAL
MAGNESIUM SERPL-MCNC: 1.8 MG/DL (ref 1.6–2.6)
MCH RBC QN AUTO: 28.7 PG (ref 27–31)
MCHC RBC AUTO-ENTMCNC: 33.1 G/DL (ref 33–36)
MCV RBC AUTO: 86.8 FL (ref 80–94)
MONOCYTES NFR BLD MANUAL: 0.78 X10(3)/MCL (ref 0.1–1.3)
MONOCYTES NFR BLD MANUAL: 7 %
NEUTROPHILS NFR BLD MANUAL: 84 %
NRBC BLD AUTO-RTO: 0.3 %
PHOSPHATE SERPL-MCNC: 2.3 MG/DL (ref 2.3–4.7)
PLATELET # BLD AUTO: 169 X10(3)/MCL (ref 130–400)
PLATELET # BLD EST: NORMAL 10*3/UL
PMV BLD AUTO: 10.7 FL (ref 7.4–10.4)
POTASSIUM SERPL-SCNC: 4 MMOL/L (ref 3.5–5.1)
PREALB SERPL-MCNC: 5.7 MG/DL (ref 16–38)
PROMYELOCYTES # BLD MANUAL: 1 %
PROT SERPL-MCNC: 4.3 GM/DL (ref 6.4–8.3)
RBC # BLD AUTO: 3.48 X10(6)/MCL (ref 4.2–5.4)
RBC MORPH BLD: ABNORMAL
SODIUM SERPL-SCNC: 133 MMOL/L (ref 136–145)
T3FREE SERPL-MCNC: <1.5 PG/ML (ref 1.58–3.91)
T4 FREE SERPL-MCNC: 0.67 NG/DL (ref 0.7–1.48)
TSH SERPL-ACNC: 1.05 UIU/ML (ref 0.35–4.94)
VANCOMYCIN TROUGH SERPL-MCNC: 6 UG/ML (ref 15–20)
WBC # SPEC AUTO: 11.33 X10(3)/MCL (ref 4.5–11.5)

## 2024-01-02 PROCEDURE — 83735 ASSAY OF MAGNESIUM: CPT | Performed by: NURSE PRACTITIONER

## 2024-01-02 PROCEDURE — 86140 C-REACTIVE PROTEIN: CPT

## 2024-01-02 PROCEDURE — 25000003 PHARM REV CODE 250: Performed by: NURSE PRACTITIONER

## 2024-01-02 PROCEDURE — 87206 SMEAR FLUORESCENT/ACID STAI: CPT | Performed by: SURGERY

## 2024-01-02 PROCEDURE — 11012 DEB SKIN BONE AT FX SITE: CPT | Mod: 58,,, | Performed by: ORTHOPAEDIC SURGERY

## 2024-01-02 PROCEDURE — 11000001 HC ACUTE MED/SURG PRIVATE ROOM

## 2024-01-02 PROCEDURE — 87077 CULTURE AEROBIC IDENTIFY: CPT | Performed by: SURGERY

## 2024-01-02 PROCEDURE — 71000033 HC RECOVERY, INTIAL HOUR: Performed by: SURGERY

## 2024-01-02 PROCEDURE — 25000003 PHARM REV CODE 250: Performed by: NURSE ANESTHETIST, CERTIFIED REGISTERED

## 2024-01-02 PROCEDURE — 85027 COMPLETE CBC AUTOMATED: CPT

## 2024-01-02 PROCEDURE — 63600175 PHARM REV CODE 636 W HCPCS: Performed by: NURSE PRACTITIONER

## 2024-01-02 PROCEDURE — C1729 CATH, DRAINAGE: HCPCS | Performed by: SURGERY

## 2024-01-02 PROCEDURE — 10061 I&D ABSCESS COMP/MULTIPLE: CPT | Mod: ,,, | Performed by: SURGERY

## 2024-01-02 PROCEDURE — 87040 BLOOD CULTURE FOR BACTERIA: CPT

## 2024-01-02 PROCEDURE — 25000003 PHARM REV CODE 250

## 2024-01-02 PROCEDURE — 45990 SURG DX EXAM ANORECTAL: CPT | Mod: 51,,, | Performed by: SURGERY

## 2024-01-02 PROCEDURE — 37000008 HC ANESTHESIA 1ST 15 MINUTES: Performed by: SURGERY

## 2024-01-02 PROCEDURE — 87075 CULTR BACTERIA EXCEPT BLOOD: CPT | Performed by: SURGERY

## 2024-01-02 PROCEDURE — P9047 ALBUMIN (HUMAN), 25%, 50ML: HCPCS | Mod: JZ,JG | Performed by: NURSE ANESTHETIST, CERTIFIED REGISTERED

## 2024-01-02 PROCEDURE — 12001 RPR S/N/AX/GEN/TRNK 2.5CM/<: CPT | Mod: 59,,, | Performed by: SURGERY

## 2024-01-02 PROCEDURE — S4991 NICOTINE PATCH NONLEGEND: HCPCS | Performed by: NURSE PRACTITIONER

## 2024-01-02 PROCEDURE — 84443 ASSAY THYROID STIM HORMONE: CPT | Performed by: NURSE PRACTITIONER

## 2024-01-02 PROCEDURE — 84100 ASSAY OF PHOSPHORUS: CPT | Performed by: NURSE PRACTITIONER

## 2024-01-02 PROCEDURE — 25000003 PHARM REV CODE 250: Performed by: SURGERY

## 2024-01-02 PROCEDURE — 27000221 HC OXYGEN, UP TO 24 HOURS

## 2024-01-02 PROCEDURE — 37000009 HC ANESTHESIA EA ADD 15 MINS: Performed by: SURGERY

## 2024-01-02 PROCEDURE — 87102 FUNGUS ISOLATION CULTURE: CPT | Performed by: SURGERY

## 2024-01-02 PROCEDURE — 84439 ASSAY OF FREE THYROXINE: CPT | Performed by: NURSE PRACTITIONER

## 2024-01-02 PROCEDURE — D9220A PRA ANESTHESIA: Mod: CRNA,,, | Performed by: NURSE ANESTHETIST, CERTIFIED REGISTERED

## 2024-01-02 PROCEDURE — 80053 COMPREHEN METABOLIC PANEL: CPT

## 2024-01-02 PROCEDURE — 84481 FREE ASSAY (FT-3): CPT | Performed by: NURSE PRACTITIONER

## 2024-01-02 PROCEDURE — D9220A PRA ANESTHESIA: Mod: ANES,,, | Performed by: ANESTHESIOLOGY

## 2024-01-02 PROCEDURE — 63600175 PHARM REV CODE 636 W HCPCS: Mod: JZ,JG | Performed by: NURSE ANESTHETIST, CERTIFIED REGISTERED

## 2024-01-02 PROCEDURE — 84134 ASSAY OF PREALBUMIN: CPT

## 2024-01-02 PROCEDURE — 36000704 HC OR TIME LEV I 1ST 15 MIN: Performed by: SURGERY

## 2024-01-02 PROCEDURE — 0QB20ZZ EXCISION OF RIGHT PELVIC BONE, OPEN APPROACH: ICD-10-PCS | Performed by: ORTHOPAEDIC SURGERY

## 2024-01-02 PROCEDURE — 87205 SMEAR GRAM STAIN: CPT | Performed by: SURGERY

## 2024-01-02 PROCEDURE — 94761 N-INVAS EAR/PLS OXIMETRY MLT: CPT

## 2024-01-02 PROCEDURE — 80202 ASSAY OF VANCOMYCIN: CPT | Performed by: SURGERY

## 2024-01-02 PROCEDURE — 63600175 PHARM REV CODE 636 W HCPCS: Performed by: NURSE ANESTHETIST, CERTIFIED REGISTERED

## 2024-01-02 PROCEDURE — 97530 THERAPEUTIC ACTIVITIES: CPT | Mod: CQ

## 2024-01-02 PROCEDURE — 0QH104Z INSERTION OF INTERNAL FIXATION DEVICE INTO SACRUM, OPEN APPROACH: ICD-10-PCS | Performed by: ORTHOPAEDIC SURGERY

## 2024-01-02 PROCEDURE — 63600175 PHARM REV CODE 636 W HCPCS: Performed by: SURGERY

## 2024-01-02 PROCEDURE — 36000705 HC OR TIME LEV I EA ADD 15 MIN: Performed by: SURGERY

## 2024-01-02 PROCEDURE — 63600175 PHARM REV CODE 636 W HCPCS

## 2024-01-02 PROCEDURE — 99232 SBSQ HOSP IP/OBS MODERATE 35: CPT | Mod: 25,,, | Performed by: SURGERY

## 2024-01-02 PROCEDURE — 63600175 PHARM REV CODE 636 W HCPCS: Performed by: ANESTHESIOLOGY

## 2024-01-02 RX ORDER — MAGNESIUM SULFATE 1 G/100ML
1 INJECTION INTRAVENOUS ONCE
Status: COMPLETED | OUTPATIENT
Start: 2024-01-02 | End: 2024-01-02

## 2024-01-02 RX ORDER — ALBUMIN HUMAN 250 G/1000ML
SOLUTION INTRAVENOUS
Status: DISCONTINUED | OUTPATIENT
Start: 2024-01-02 | End: 2024-01-02

## 2024-01-02 RX ORDER — ONDANSETRON 2 MG/ML
4 INJECTION INTRAMUSCULAR; INTRAVENOUS ONCE
Status: DISCONTINUED | OUTPATIENT
Start: 2024-01-02 | End: 2024-01-02 | Stop reason: HOSPADM

## 2024-01-02 RX ORDER — DEXAMETHASONE SODIUM PHOSPHATE 4 MG/ML
INJECTION, SOLUTION INTRA-ARTICULAR; INTRALESIONAL; INTRAMUSCULAR; INTRAVENOUS; SOFT TISSUE
Status: DISCONTINUED | OUTPATIENT
Start: 2024-01-02 | End: 2024-01-02

## 2024-01-02 RX ORDER — PHENYLEPHRINE HYDROCHLORIDE 10 MG/ML
INJECTION INTRAVENOUS
Status: DISCONTINUED | OUTPATIENT
Start: 2024-01-02 | End: 2024-01-02

## 2024-01-02 RX ORDER — SODIUM CHLORIDE 9 MG/ML
INJECTION, SOLUTION INTRAVENOUS CONTINUOUS
Status: CANCELLED | OUTPATIENT
Start: 2024-01-02

## 2024-01-02 RX ORDER — ACETAMINOPHEN 500 MG
1000 TABLET ORAL ONCE
Status: CANCELLED | OUTPATIENT
Start: 2024-01-02 | End: 2024-01-02

## 2024-01-02 RX ORDER — ONDANSETRON 2 MG/ML
INJECTION INTRAMUSCULAR; INTRAVENOUS
Status: DISCONTINUED | OUTPATIENT
Start: 2024-01-02 | End: 2024-01-02

## 2024-01-02 RX ORDER — LIDOCAINE HYDROCHLORIDE 10 MG/ML
1 INJECTION, SOLUTION EPIDURAL; INFILTRATION; INTRACAUDAL; PERINEURAL ONCE
Status: CANCELLED | OUTPATIENT
Start: 2024-01-02 | End: 2024-01-02

## 2024-01-02 RX ORDER — HYDROCODONE BITARTRATE AND ACETAMINOPHEN 5; 325 MG/1; MG/1
1 TABLET ORAL
Status: DISCONTINUED | OUTPATIENT
Start: 2024-01-02 | End: 2024-01-02 | Stop reason: HOSPADM

## 2024-01-02 RX ORDER — FAMOTIDINE 10 MG/ML
20 INJECTION INTRAVENOUS ONCE
Status: CANCELLED | OUTPATIENT
Start: 2024-01-02 | End: 2024-01-02

## 2024-01-02 RX ORDER — HYDROMORPHONE HYDROCHLORIDE 2 MG/ML
0.2 INJECTION, SOLUTION INTRAMUSCULAR; INTRAVENOUS; SUBCUTANEOUS EVERY 5 MIN PRN
Status: DISCONTINUED | OUTPATIENT
Start: 2024-01-02 | End: 2024-01-02 | Stop reason: HOSPADM

## 2024-01-02 RX ORDER — FUROSEMIDE 10 MG/ML
20 INJECTION INTRAMUSCULAR; INTRAVENOUS ONCE
Status: COMPLETED | OUTPATIENT
Start: 2024-01-02 | End: 2024-01-02

## 2024-01-02 RX ORDER — CALCIUM CHLORIDE INJECTION 100 MG/ML
INJECTION, SOLUTION INTRAVENOUS
Status: DISCONTINUED | OUTPATIENT
Start: 2024-01-02 | End: 2024-01-02

## 2024-01-02 RX ORDER — IPRATROPIUM BROMIDE AND ALBUTEROL SULFATE 2.5; .5 MG/3ML; MG/3ML
3 SOLUTION RESPIRATORY (INHALATION) ONCE AS NEEDED
Status: DISCONTINUED | OUTPATIENT
Start: 2024-01-02 | End: 2024-01-02 | Stop reason: HOSPADM

## 2024-01-02 RX ORDER — FENTANYL CITRATE 50 UG/ML
INJECTION, SOLUTION INTRAMUSCULAR; INTRAVENOUS
Status: DISCONTINUED | OUTPATIENT
Start: 2024-01-02 | End: 2024-01-02

## 2024-01-02 RX ORDER — MIDAZOLAM HYDROCHLORIDE 1 MG/ML
2 INJECTION INTRAMUSCULAR; INTRAVENOUS ONCE AS NEEDED
Status: CANCELLED | OUTPATIENT
Start: 2024-01-02 | End: 2035-05-30

## 2024-01-02 RX ORDER — DIPHENHYDRAMINE HYDROCHLORIDE 50 MG/ML
25 INJECTION, SOLUTION INTRAMUSCULAR; INTRAVENOUS EVERY 6 HOURS PRN
Status: DISCONTINUED | OUTPATIENT
Start: 2024-01-02 | End: 2024-01-02 | Stop reason: HOSPADM

## 2024-01-02 RX ORDER — METOCLOPRAMIDE HYDROCHLORIDE 5 MG/ML
10 INJECTION INTRAMUSCULAR; INTRAVENOUS EVERY 10 MIN PRN
Status: DISCONTINUED | OUTPATIENT
Start: 2024-01-02 | End: 2024-01-02 | Stop reason: HOSPADM

## 2024-01-02 RX ORDER — MEPERIDINE HYDROCHLORIDE 25 MG/ML
12.5 INJECTION INTRAMUSCULAR; INTRAVENOUS; SUBCUTANEOUS ONCE
Status: DISCONTINUED | OUTPATIENT
Start: 2024-01-02 | End: 2024-01-02 | Stop reason: HOSPADM

## 2024-01-02 RX ORDER — LIDOCAINE HYDROCHLORIDE 20 MG/ML
INJECTION INTRAVENOUS
Status: DISCONTINUED | OUTPATIENT
Start: 2024-01-02 | End: 2024-01-02

## 2024-01-02 RX ORDER — METOCLOPRAMIDE HYDROCHLORIDE 5 MG/ML
10 INJECTION INTRAMUSCULAR; INTRAVENOUS ONCE
Status: CANCELLED | OUTPATIENT
Start: 2024-01-02 | End: 2024-01-02

## 2024-01-02 RX ORDER — PROPOFOL 10 MG/ML
INJECTION, EMULSION INTRAVENOUS
Status: DISCONTINUED | OUTPATIENT
Start: 2024-01-02 | End: 2024-01-02

## 2024-01-02 RX ADMIN — DRONABINOL 2.5 MG: 2.5 CAPSULE ORAL at 08:01

## 2024-01-02 RX ADMIN — VANCOMYCIN HYDROCHLORIDE 1000 MG: 1 INJECTION, POWDER, LYOPHILIZED, FOR SOLUTION INTRAVENOUS at 07:01

## 2024-01-02 RX ADMIN — FAMOTIDINE 20 MG: 20 TABLET ORAL at 08:01

## 2024-01-02 RX ADMIN — OXYCODONE HYDROCHLORIDE 10 MG: 10 TABLET ORAL at 01:01

## 2024-01-02 RX ADMIN — VANCOMYCIN HYDROCHLORIDE 1250 MG: 1.25 INJECTION, POWDER, LYOPHILIZED, FOR SOLUTION INTRAVENOUS at 09:01

## 2024-01-02 RX ADMIN — ENOXAPARIN SODIUM 40 MG: 100 INJECTION SUBCUTANEOUS at 10:01

## 2024-01-02 RX ADMIN — PHENYLEPHRINE HYDROCHLORIDE 100 MCG: 10 INJECTION INTRAVENOUS at 08:01

## 2024-01-02 RX ADMIN — VANCOMYCIN HYDROCHLORIDE 1000 MG: 1 INJECTION, POWDER, LYOPHILIZED, FOR SOLUTION INTRAVENOUS at 02:01

## 2024-01-02 RX ADMIN — CALCIUM CHLORIDE INJECTION 0.5 G: 100 INJECTION, SOLUTION INTRAVENOUS at 07:01

## 2024-01-02 RX ADMIN — MUPIROCIN: 20 OINTMENT TOPICAL at 10:01

## 2024-01-02 RX ADMIN — SODIUM CHLORIDE, SODIUM GLUCONATE, SODIUM ACETATE, POTASSIUM CHLORIDE AND MAGNESIUM CHLORIDE: 526; 502; 368; 37; 30 INJECTION, SOLUTION INTRAVENOUS at 07:01

## 2024-01-02 RX ADMIN — CEFEPIME 2 G: 2 INJECTION, POWDER, FOR SOLUTION INTRAVENOUS at 01:01

## 2024-01-02 RX ADMIN — FENTANYL CITRATE 50 MCG: 50 INJECTION, SOLUTION INTRAMUSCULAR; INTRAVENOUS at 08:01

## 2024-01-02 RX ADMIN — MAGNESIUM SULFATE IN DEXTROSE 1 G: 10 INJECTION, SOLUTION INTRAVENOUS at 02:01

## 2024-01-02 RX ADMIN — PHENYLEPHRINE HYDROCHLORIDE 100 MCG: 10 INJECTION INTRAVENOUS at 07:01

## 2024-01-02 RX ADMIN — BACITRACIN: 500 OINTMENT TOPICAL at 02:01

## 2024-01-02 RX ADMIN — PROPOFOL 200 MG: 10 INJECTION, EMULSION INTRAVENOUS at 07:01

## 2024-01-02 RX ADMIN — METHOCARBAMOL 500 MG: 500 TABLET ORAL at 01:01

## 2024-01-02 RX ADMIN — DIVALPROEX SODIUM 500 MG: 500 TABLET, FILM COATED, EXTENDED RELEASE ORAL at 08:01

## 2024-01-02 RX ADMIN — DOCUSATE SODIUM 100 MG: 100 CAPSULE, LIQUID FILLED ORAL at 08:01

## 2024-01-02 RX ADMIN — FUROSEMIDE 20 MG: 10 INJECTION, SOLUTION INTRAMUSCULAR; INTRAVENOUS at 06:01

## 2024-01-02 RX ADMIN — POLYETHYLENE GLYCOL 3350 17 G: 17 POWDER, FOR SOLUTION ORAL at 08:01

## 2024-01-02 RX ADMIN — ENOXAPARIN SODIUM 40 MG: 100 INJECTION SUBCUTANEOUS at 08:01

## 2024-01-02 RX ADMIN — PHENYLEPHRINE HYDROCHLORIDE 200 MCG: 10 INJECTION INTRAVENOUS at 07:01

## 2024-01-02 RX ADMIN — ALBUMIN (HUMAN) 100 ML: 12.5 SOLUTION INTRAVENOUS at 07:01

## 2024-01-02 RX ADMIN — SODIUM CHLORIDE: 9 INJECTION, SOLUTION INTRAVENOUS at 08:01

## 2024-01-02 RX ADMIN — BACITRACIN: 500 OINTMENT TOPICAL at 08:01

## 2024-01-02 RX ADMIN — OXYCODONE HYDROCHLORIDE 10 MG: 10 TABLET ORAL at 08:01

## 2024-01-02 RX ADMIN — ACETAMINOPHEN 325MG 650 MG: 325 TABLET ORAL at 01:01

## 2024-01-02 RX ADMIN — OXYCODONE HYDROCHLORIDE 10 MG: 10 TABLET ORAL at 05:01

## 2024-01-02 RX ADMIN — GABAPENTIN 400 MG: 400 CAPSULE ORAL at 08:01

## 2024-01-02 RX ADMIN — OXYCODONE HYDROCHLORIDE 10 MG: 10 TABLET ORAL at 02:01

## 2024-01-02 RX ADMIN — NICOTINE 1 PATCH: 14 PATCH TRANSDERMAL at 10:01

## 2024-01-02 RX ADMIN — DEXAMETHASONE SODIUM PHOSPHATE 4 MG: 4 INJECTION, SOLUTION INTRA-ARTICULAR; INTRALESIONAL; INTRAMUSCULAR; INTRAVENOUS; SOFT TISSUE at 07:01

## 2024-01-02 RX ADMIN — LIDOCAINE HYDROCHLORIDE 60 MG: 20 INJECTION INTRAVENOUS at 07:01

## 2024-01-02 RX ADMIN — ONDANSETRON 4 MG: 2 INJECTION INTRAMUSCULAR; INTRAVENOUS at 07:01

## 2024-01-02 RX ADMIN — METHOCARBAMOL 500 MG: 500 TABLET ORAL at 08:01

## 2024-01-02 RX ADMIN — FENTANYL CITRATE 50 MCG: 50 INJECTION, SOLUTION INTRAMUSCULAR; INTRAVENOUS at 07:01

## 2024-01-02 RX ADMIN — GABAPENTIN 400 MG: 400 CAPSULE ORAL at 02:01

## 2024-01-02 RX ADMIN — SODIUM CHLORIDE 1000 ML: 9 INJECTION, SOLUTION INTRAVENOUS at 01:01

## 2024-01-02 RX ADMIN — HYDROMORPHONE HYDROCHLORIDE 0.2 MG: 2 INJECTION INTRAMUSCULAR; INTRAVENOUS; SUBCUTANEOUS at 01:01

## 2024-01-02 RX ADMIN — SODIUM CHLORIDE: 9 INJECTION, SOLUTION INTRAVENOUS at 01:01

## 2024-01-02 RX ADMIN — HYDROMORPHONE HYDROCHLORIDE 0.2 MG: 2 INJECTION INTRAMUSCULAR; INTRAVENOUS; SUBCUTANEOUS at 09:01

## 2024-01-02 RX ADMIN — TRAZODONE HYDROCHLORIDE 50 MG: 50 TABLET ORAL at 08:01
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
## 2024-01-02 NOTE — CONSULTS
Inpatient Nutrition Evaluation    Admit Date: 12/28/2023   Total duration of encounter: 5 days   Patient Age: 23 y.o.    Nutrition Recommendation/Prescription     -Continue Regular Diet as tolerated.   -Add Boost Plus TID -- provides 360 kcals and 14 g Pro per ONS    Nutrition Assessment     Chart Review    Reason Seen: physician consult for supplement recommendations    Malnutrition Screening Tool Results   Have you recently lost weight without trying?: No  Have you been eating poorly because of a decreased appetite?: No   MST Score: 0   Diagnosis:  Open fracture of right tibia and fibula  Multiple closed fracture of the pelvis  Right acetabular fracture  Sacral fracture  Right sided rib fractures  T11-L3 spinous process fracture   L1-L2 transverse process fracture  Pulmonary contusions  L buttock laceration  L knee laceration    Relevant Medical History:   None on file.     Scheduled Medications:  bacitracin, , TID  ceFEPime (MAXIPIME) IVPB, 2 g, Q12H  divalproex ER, 500 mg, BID  docusate sodium, 100 mg, BID  droNABinol, 2.5 mg, BID  enoxaparin, 40 mg, Q12H  famotidine, 20 mg, BID  gabapentin, 400 mg, TID  methocarbamoL, 500 mg, QID  mupirocin, , BID  nicotine, 1 patch, Daily  polyethylene glycol, 17 g, BID  traZODone, 50 mg, QHS  vancomycin (VANCOCIN) IV (PEDS and ADULTS), 1,000 mg, Q8H    Continuous Infusions:  sodium chloride 0.9%, Last Rate: 100 mL/hr at 01/02/24 0541    PRN Medications: 0.9%  NaCl infusion (for blood administration), heparin, porcine (PF), HYDROmorphone, hydrOXYzine pamoate, LIDOcaine, LORazepam, magnesium hydroxide 400 mg/5 ml, melatonin, oxyCODONE, oxyCODONE, simethicone, Pharmacy to dose Vancomycin consult **AND** vancomycin - pharmacy to dose    Recent Labs   Lab 12/28/23  0454 12/28/23  1158 12/29/23  0751 12/29/23  1236 12/30/23  0008 12/30/23  0600 12/30/23  1445 12/31/23  0015 12/31/23  0828 01/01/24  0616 01/02/24  0020 01/02/24  0052     --  134*  --   --  137  --   --  134*  132* 133*  --    K 4.6  --  4.5  --   --  4.1  --   --  4.0 3.8 4.0  --    CALCIUM 8.4  --  7.0*  --   --  6.9*  --   --  7.4* 7.7* 7.4*  --    PHOS  --   --  2.4  --   --   --   --   --   --   --  2.3  --    MG  --   --  2.00  --   --   --   --   --   --   --  1.80  --    CHLORIDE 109*  --  106  --   --  108*  --   --  106 105 104  --    CO2 19*  --  23  --   --  22  --   --  22 23 21*  --    BUN 14.3  --  8.6  --   --  7.5  --   --  6.3* 5.1* 6.8*  --    CREATININE 0.80  --  0.57  --   --  0.55  --   --  0.51* 0.47* 0.47*  --    EGFRNORACEVR >60  --  >60  --   --  >60  --   --  >60 >60 >60  --    GLUCOSE 179*  --  108*  --   --  102*  --   --  111* 109* 129*  --    BILITOT 0.2  --  0.3  --   --  0.3  --   --  0.4 0.4 0.7  --    ALKPHOS 56  --  43  --   --  46  --   --  51 64 82  --    ALT 63*  --  36  --   --  27  --   --  20 16 17  --    *  --  95*  --   --  69*  --   --  47* 33 30  --    ALBUMIN 3.6  --  2.5*  --   --  2.3*  --   --  2.0* 1.6* 1.5*  --    PREALB  --   --   --   --   --   --   --   --   --  6.8* 5.7*  --    CRP  --   --   --   --   --   --   --   --   --  325.20* 366.70*  --    WBC 11.23   < > 10.06   < > 8.98 9.82 8.45 9.98 9.34 15.03  15.03*  --  11.16  11.33   HGB 10.0*   < > 8.9*   < > 7.2* 7.2* 8.4* 10.0* 10.3* 9.8*  --  10.0*   HCT 31.9*   < > 27.2*   < > 21.2* 22.1* 26.6* 30.3* 30.3* 28.8*  --  30.2*    < > = values in this interval not displayed.     Nutrition Orders:  Diet Adult Regular      Appetite/Oral Intake: poor/0-25% of meals  Factors Affecting Nutritional Intake: decreased appetite  Food/Christianity/Cultural Preferences: none reported  Food Allergies: none reported  Last Bowel Movement: 01/02/24  Wound(s):     Altered Skin Integrity 12/29/23 0830 Left anterior;lower;proximal Leg Abrasion(s)-Tissue loss description: Partial thickness     Comments    1/2/24: Spoke with pt's mom at bedside. She states that the pt has had poor intake since admit, only eating a few bites of  "each meal. She believes that the pt would be agreeable to Boost supplements TID to aid in kcal/pro intake.     Anthropometrics    Height: 5' 3" (160 cm), Height Method: Stated  Last Weight: 68 kg (150 lb) (12/28/23 1749), Weight Method: Bed Scale  BMI (Calculated): 26.6  BMI Classification: overweight (BMI 25-29.9)     Ideal Body Weight (IBW), Female: 115 lb     % Ideal Body Weight, Female (lb): 130.43 %                             Usual Weight Provided By: family/caregiver denies unintentional weight loss    Wt Readings from Last 5 Encounters:   12/28/23 68 kg (150 lb)     Weight Change(s) Since Admission: No new wts to trend.   Wt Readings from Last 1 Encounters:   12/28/23 1749 68 kg (150 lb)   12/28/23 0410 68 kg (150 lb)   Admit Weight: 68 kg (150 lb) (12/28/23 0410), Weight Method: Stated    Patient Education     Not applicable.    Nutrition Goals & Monitoring     Dietitian will monitor: energy intake and weight    Nutrition Risk/Follow-Up: low (follow-up in 5-7 days)  Patients assigned 'low nutrition risk' status do not qualify for a full nutritional assessment but will be monitored and re-evaluated in a 5-7 day time period. Please consult if re-evaluation needed sooner.    "
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
## 2024-01-02 NOTE — NURSING
"1948- Notified provider that pt is requesting nicotine patch.     New orders placed.     2027- Notified provider that pt has had tachycardia charted throughout the day, and that HR was currently in 130's. Provider verified pt was on maintenance fluids.    Pt continues to remove NC and O2 dropping into mid-high 80's. Educated pt on importance of keeping oxygen on. Pt states "It is uncomfortable" Offered Oxymask, placed patient on oxymask. Staff continuing to have to go and place mask back on patient.     2200- Notified provider that HR has maintained in 130's-140's. Provider came to bedside. Provider explaining to family the different possibilities. Family is voicing concern for pain and that patient is "not moving enough". Provider and staff explained importance of movement and IS. Family verbalized understanding and pt remained with flat effect and stated "ok".     New orders placed.     2350- Notified provider that pt is sustaining in the 150's.     Labs ordered to be drawn now.     Pt appears in no acute respiratory distress at this time. Denies SOB and no accessory muscles being used. Staff continuing to have to go and place Oxymask on pt.     0206- 1g of Mag given.   HR remains in 130s. No acute distress noted.     0520- surgical bath complete and dressings changed. No acute distress noted.     0625- Pt left with transportation to . No acute distress noted.   "
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
## 2024-01-02 NOTE — OP NOTE
[facility]                       [address] [place] [address]    PATIENT NAME:      BLOSSOM MERRITT   YOB: 2000  CSN:               [id number]  MRN:               [id number]  ADMIT DATE:        12/28/2023 04:09:00  PHYSICIAN:         Sammy Quick MD                          OPERATIVE REPORT      DATE OF SURGERY:    01/02/2024 00:00:00    SURGEON:  Sammy Quick MD    PROCEDURE:  I and D of left inner thigh abscess, simple repair of 8 cm   laceration in the perianal region.    PREOPERATIVE DIAGNOSIS:  Left inner thigh abscess and possible open fracture.    POSTOPERATIVE DIAGNOSIS:  Left inner thigh abscess and possible open fracture.    ANESTHESIA:  General endotracheal anesthesia.    COMPLICATIONS:  None.    EBL:  20.    CONDITION:  Good.    SPECIMENS:  Cultures that were sent.    Assistant: Zak Trevino     INDICATION FOR PROCEDURE:  This is a 23-year-old female with multiple injuries   and more specifically, she has a previous laceration to her perianal area that   was repaired and she had posterior pelvic fixation with screws.  She is now   developing some swelling on her left inner thigh.  I spoke with Orthopedics.    There was some concern that this was connecting to the inferior pubic ramus   fracture and superior pubic ramus fracture that were initially felt to be stable   and nonoperative.  We discussed this and we decided to wash the area out.    FINDINGS:  Left thigh wound did connect to both fractures.  We copiously   irrigated the area.  The left buttock wounds did not appear to be contiguous   with the area so we did not break that plane, but we re-sutured it with chromic.    It was Approximately an 8 cm laceration     PROCEDURE IN DETAIL:  The patient was brought to the operating room.  She was   brought under general endotracheal anesthesia.  She was prepped and draped in   sterile fashion and  antibiotics given, and DVT prophylaxis taken and a time-out   was called.  An incision was made in the left medial thigh and an instant amount   of significant pus was drained from the area.  I would say close to a liter of pus   was drained.  An additional incision was made in the more superior aspect of   the thigh near the inguinal ligament.  These areas were contiguous.  There was a   large pocket of pus.  These connected to both areas of the fractures.    Orthopedics was present.  They copiously irrigated the area and debrided it, that   portion of the case will be dictated by them.  The perianal wound was closed   with nylon sutures.  It was a difficult area to access for suture removal,  so we felt that   replacing these with chromic sutures was necessary, so we removed the sutures   and replaced them with chromics.  This also allowed us to see if there was underlying infection there, and there was NOT.  This area did not connect with the other   areas.  The vagina was also inspected with a speculum and there was no evidence   of vaginal injury.  Digital rectal exam was performed as well, and there was not apparent injury there.,  Lap, sponge, needle, instrument counts were correct.  Penrose   drains were placed between the 2 new incisions to keep the areas open.  Areas   were dressed.  The patient tolerated procedure well, was awoken without   difficulty and brought to the recovery room without further event.        ______________________________  MD BRIE Wheeler/MARCELINAS  DD:  01/02/2024  Time:  08:37AM  DT:  01/02/2024  Time:  08:57AM  Job #:  [id number]      OPERATIVE REPORT

## 2024-01-02 NOTE — ANESTHESIA PROCEDURE NOTES
Intubation    Date/Time: 1/2/2024 7:07 AM    Performed by: Jimmy Lezama CRNA  Authorized by: Tigre Castillo DO    Intubation:     Induction:  Intravenous    Intubated:  Postinduction    Mask Ventilation:  Easy mask    Attempts:  1    Attempted By:  CRNA    Difficult Airway Encountered?: No      Complications:  None    Airway Device:  Supraglottic airway/LMA    Airway Device Size:  4.0    Style/Cuff Inflation:  Cuffed (inflated to minimal occlusive pressure)    Inflation Amount (mL):  18    Placement Verified By:  Capnometry    Complicating Factors:  None    Findings Post-Intubation:  BS equal bilateral

## 2024-01-02 NOTE — PROGRESS NOTES
Patient in operating room upon evaluation this morning.  Called into the OR by attending Dr. Sammy Griffin for evaluation of medial thigh abscess near perianal traumatic laceration.  There was concerned that this communicated with her anterior pelvic ring fracture.    Reviewed CT scan of the left thigh, air noted near the fracture site of the inferior ramus/superior ramus on the left side.  Her posterior pelvic ring stabilization is intact following SI screw placement last week.    Assessment and plan:  We will scrub in and assist Dr. Echavarria with evaluation of the wound they plan for incision and drainage of the medial thigh abscess with placement of a drain with a vaginal exam rectal exam not we will be available to evaluate the wound to determine if there is involvement with the fracture site.    This note/OR report was created with the assistance of  voice recognition software or phone  dictation.  There may be transcription errors as a result of using this technology however minimal. Effort has been made to assure accuracy of transcription but any obvious errors or omissions should be clarified with the author of the document.         Ravin Ferreira MD  Orthopedic Trauma  [facility]

## 2024-01-02 NOTE — BRIEF OP NOTE
[facility] - Periop Services  Brief Operative Note    SUMMARY     Surgery Date: 1/2/2024     Surgeon(s) and Role:     * Sammy Quick MD - Primary     * Ravin Ferreira MD    Assisting Surgeon: None    Pre-op Diagnosis: Multiple pelvic ring fractures, open    Post-op Diagnosis:  same    Procedure(s) (LRB):  INCISION AND DRAINAGE, open fx including bone    Anesthesia: General    Implants:  * No implants in log *    Operative Findings: see op report    Estimated Blood Loss: 50    Estimated Blood Loss has been documented..         Specimens:   Specimen (24h ago, onward)      None            [id number]    A/P: Tolerated procedure well. Admit to floor. WB for stand to transfer only, no ambulation. Will likely need long term IV abx and multiple irrigation and debridements of perineal wound/medial thigh abscess. No need for operative stabilization of anterior pelvic ring at this time. We will be available to reevaluate as needed.       Ravin Ferreira MD  Orthopedic Trauma  [facility]

## 2024-01-02 NOTE — ANESTHESIA POSTPROCEDURE EVALUATION
Anesthesia Post Evaluation    Patient: Sandra Hernandez    Procedure(s) Performed: Procedure(s) (LRB):  INCISION AND DRAINAGE, LOWER EXTREMITY (Left)    Final Anesthesia Type: general      Patient location during evaluation: PACU  Patient participation: Yes- Able to Participate  Level of consciousness: awake and alert  Post-procedure vital signs: reviewed and stable  Pain management: adequate  Airway patency: patent  GRISELDA mitigation strategies: Multimodal analgesia  PONV status at discharge: No PONV  Anesthetic complications: no      Cardiovascular status: hemodynamically stable  Respiratory status: unassisted  Hydration status: euvolemic  Follow-up not needed.              Vitals Value Taken Time   /83 01/02/24 0941   Temp 36.5 °C (97.7 °F) 01/02/24 0847   Pulse 124 01/02/24 1208   Resp 18 01/02/24 1315   SpO2 98 % 01/02/24 1208   Vitals shown include unvalidated device data.      Event Time   Out of Recovery 01/02/2024 09:46:00         Pain/South Score: Pain Rating Prior to Med Admin: 10 (1/2/2024  1:15 PM)  Pain Rating Post Med Admin: 0 (1/2/2024  6:13 AM)  South Score: 8 (1/2/2024  9:45 AM)

## 2024-01-02 NOTE — PROGRESS NOTES
Pharmacokinetic Assessment Follow Up: IV Vancomycin    Vancomycin serum concentration assessment(s):    The trough level was drawn correctly and can be used to guide therapy at this time. The measurement is below the desired definitive target range of 10 to 20 mcg/mL.    Vancomycin Regimen Plan:    Change to 1250 mg q8h and check trough on 1/3 at 2100.    Drug levels (last 3 results):  Recent Labs   Lab Result Units 01/02/24  1428   Vancomycin Trough ug/ml 6.0*       Patient brief summary:  Sandra Hernandez is a 23 y.o. female initiated on antimicrobial therapy with IV Vancomycin for treatment of skin & soft tissue infection      Drug Allergies:   Review of patient's allergies indicates:   Allergen Reactions    Aspirin     Haldol [haloperidol lactate]        Actual Body Weight:   68 kg    Renal Function:   Estimated Creatinine Clearance: 172.2 mL/min (A) (based on SCr of 0.47 mg/dL (L)).,     Dialysis Method (if applicable):  N/A    CBC (last 72 hours):  Recent Labs   Lab Result Units 12/31/23  0015 12/31/23  0828 01/01/24  0616 01/02/24  0052   WBC x10(3)/mcL 9.98 9.34 15.03  15.03* 11.16  11.33   Hgb g/dL 10.0* 10.3* 9.8* 10.0*   Hct % 30.3* 30.3* 28.8* 30.2*   Platelet x10(3)/mcL 142 141 163 169   Mono % % 6.8 6.0  --   --    Monocytes % %  --   --  6 7   Eosinophils % %  --   --  2  --    Eos % % 1.4 0.7  --   --    Basophil % % 0.5 0.5  --   --        Metabolic Panel (last 72 hours):  Recent Labs   Lab Result Units 12/31/23  0828 01/01/24  0616 01/01/24  2326 01/02/24  0020   Sodium Level mmol/L 134* 132*  --  133*   Potassium Level mmol/L 4.0 3.8  --  4.0   Chloride mmol/L 106 105  --  104   Carbon Dioxide mmol/L 22 23  --  21*   Glucose Level mg/dL 111* 109*  --  129*   Glucose, UA   --   --  Trace*  --    Blood Urea Nitrogen mg/dL 6.3* 5.1*  --  6.8*   Creatinine mg/dL 0.51* 0.47*  --  0.47*   Albumin Level g/dL 2.0* 1.6*  --  1.5*   Bilirubin Total mg/dL 0.4 0.4  --  0.7   Alkaline Phosphatase unit/L 51 64  --   82   Aspartate Aminotransferase unit/L 47* 33  --  30   Alanine Aminotransferase unit/L 20 16  --  17   Magnesium Level mg/dL  --   --   --  1.80   Phosphorus Level mg/dL  --   --   --  2.3       Vancomycin Administrations:  vancomycin given in the last 96 hours                     vancomycin in dextrose 5 % 1 gram/250 mL IVPB 1,000 mg (mg) 1,000 mg New Bag 01/02/24 1422     1,000 mg Bolus  0723     1,000 mg New Bag 01/01/24 2236     1,000 mg New Bag  1634                    Microbiologic Results:  Microbiology Results (last 7 days)       Procedure Component Value Units Date/Time    Respiratory Culture [[id number]] Collected: 01/01/24 2326    Order Status: Completed Specimen: Respiratory from Sputum, Expectorated Updated: 01/02/24 0841     GRAM STAIN Quality 1+      Moderate Gram positive cocci      Moderate Gram Positive Rods      Moderate Yeast      Few Gram Negative Rods    Gram Stain [[id number]] Collected: 01/02/24 0759    Order Status: Sent Specimen: Abscess from Thigh, Left Updated: 01/02/24 0834    Wound Culture [[id number]] Collected: 01/02/24 0759    Order Status: Sent Specimen: Abscess from Thigh, Left Updated: 01/02/24 0834    Anaerobic Culture [[id number]] Collected: 01/02/24 0759    Order Status: Sent Specimen: Abscess from Thigh, Left Updated: 01/02/24 0834    Fungal Culture [[id number]] Collected: 01/02/24 0759    Order Status: Sent Specimen: Abscess from Thigh, Left Updated: 01/02/24 0834    AFB Smear [[id number]] Collected: 01/02/24 0759    Order Status: Sent Specimen: Abscess from Thigh, Left Updated: 01/02/24 0834    Blood Culture [[id number]] Collected: 01/02/24 0425    Order Status: Resulted Specimen: Blood from Antecubital, Left Updated: 01/02/24 0441    Urine culture [[id number]] Collected: 01/01/24 2326    Order Status: Sent Specimen: Urine Updated: 01/01/24 2341    Blood Culture [[id number]] Collected: 01/01/24 1755    Order Status: Resulted Specimen: Blood from Antecubital, Right Updated:  01/01/24 1801

## 2024-01-02 NOTE — TRANSFER OF CARE
"Anesthesia Transfer of Care Note    Patient: Sandra Hernandez    Procedure(s) Performed: Procedure(s) (LRB):  INCISION AND DRAINAGE, LOWER EXTREMITY (Left)    Patient location: PACU    Anesthesia Type: general    Transport from OR: Transported from OR on room air with adequate spontaneous ventilation    Post pain: adequate analgesia    Post assessment: no apparent anesthetic complications and tolerated procedure well    Post vital signs: stable    Level of consciousness: sedated and responds to stimulation    Nausea/Vomiting: no nausea/vomiting    Complications: none    Transfer of care protocol was followed      Last vitals: Visit Vitals  BP (!) 106/58 (BP Location: Right arm, Patient Position: Lying)   Pulse (!) 117   Temp 36.5 °C (97.7 °F) (Skin)   Resp 18   Ht 5' 3" (1.6 m)   Wt 68 kg (150 lb)   SpO2 100%   Breastfeeding No   BMI 26.57 kg/m²     "
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
## 2024-01-03 LAB
ABS NEUT (OLG): 13.91 X10(3)/MCL (ref 2.1–9.2)
ALBUMIN SERPL-MCNC: 1.9 G/DL (ref 3.5–5)
ALBUMIN/GLOB SERPL: 0.6 RATIO (ref 1.1–2)
ALP SERPL-CCNC: 64 UNIT/L (ref 40–150)
ALT SERPL-CCNC: 14 UNIT/L (ref 0–55)
AST SERPL-CCNC: 25 UNIT/L (ref 5–34)
BILIRUB SERPL-MCNC: 0.5 MG/DL
BUN SERPL-MCNC: 9.5 MG/DL (ref 7–18.7)
CALCIUM SERPL-MCNC: 7.7 MG/DL (ref 8.4–10.2)
CHLORIDE SERPL-SCNC: 105 MMOL/L (ref 98–107)
CO2 SERPL-SCNC: 24 MMOL/L (ref 22–29)
CREAT SERPL-MCNC: 0.53 MG/DL (ref 0.55–1.02)
EOSINOPHIL NFR BLD MANUAL: 0.16 X10(3)/MCL (ref 0–0.9)
EOSINOPHIL NFR BLD MANUAL: 1 %
ERYTHROCYTE [DISTWIDTH] IN BLOOD BY AUTOMATED COUNT: 16.8 % (ref 11.5–17)
GFR SERPLBLD CREATININE-BSD FMLA CKD-EPI: >60 MLS/MIN/1.73/M2
GLOBULIN SER-MCNC: 3.1 GM/DL (ref 2.4–3.5)
GLUCOSE SERPL-MCNC: 140 MG/DL (ref 74–100)
GRAM STN SPEC: NORMAL
HCT VFR BLD AUTO: 23.7 % (ref 37–47)
HGB BLD-MCNC: 7.9 G/DL (ref 12–16)
INSTRUMENT WBC (OLG): 16.18 X10(3)/MCL
LYMPHOCYTES NFR BLD MANUAL: 0.49 X10(3)/MCL
LYMPHOCYTES NFR BLD MANUAL: 3 %
M AVIUM PARATB TISS QL ZN STN: NORMAL
MCH RBC QN AUTO: 28.6 PG (ref 27–31)
MCHC RBC AUTO-ENTMCNC: 33.3 G/DL (ref 33–36)
MCV RBC AUTO: 85.9 FL (ref 80–94)
METAMYELOCYTES NFR BLD MANUAL: 1 %
MONOCYTES NFR BLD MANUAL: 1.13 X10(3)/MCL (ref 0.1–1.3)
MONOCYTES NFR BLD MANUAL: 7 %
MYELOCYTES NFR BLD MANUAL: 3 %
NEUTROPHILS NFR BLD MANUAL: 86 %
NRBC BLD AUTO-RTO: 0.2 %
PLATELET # BLD AUTO: 220 X10(3)/MCL (ref 130–400)
PLATELET # BLD EST: NORMAL 10*3/UL
PMV BLD AUTO: 10.7 FL (ref 7.4–10.4)
POIKILOCYTOSIS BLD QL SMEAR: ABNORMAL
POTASSIUM SERPL-SCNC: 4.1 MMOL/L (ref 3.5–5.1)
PROT SERPL-MCNC: 5 GM/DL (ref 6.4–8.3)
RBC # BLD AUTO: 2.76 X10(6)/MCL (ref 4.2–5.4)
RBC MORPH BLD: ABNORMAL
SODIUM SERPL-SCNC: 136 MMOL/L (ref 136–145)
TARGETS BLD QL SMEAR: ABNORMAL
WBC # SPEC AUTO: 16.18 X10(3)/MCL (ref 4.5–11.5)

## 2024-01-03 PROCEDURE — 25000003 PHARM REV CODE 250: Performed by: SURGERY

## 2024-01-03 PROCEDURE — 80053 COMPREHEN METABOLIC PANEL: CPT

## 2024-01-03 PROCEDURE — 97167 OT EVAL HIGH COMPLEX 60 MIN: CPT

## 2024-01-03 PROCEDURE — 97530 THERAPEUTIC ACTIVITIES: CPT

## 2024-01-03 PROCEDURE — 85027 COMPLETE CBC AUTOMATED: CPT

## 2024-01-03 PROCEDURE — 63600175 PHARM REV CODE 636 W HCPCS: Performed by: SURGERY

## 2024-01-03 PROCEDURE — 63600175 PHARM REV CODE 636 W HCPCS

## 2024-01-03 PROCEDURE — 25000003 PHARM REV CODE 250: Performed by: NURSE PRACTITIONER

## 2024-01-03 PROCEDURE — 25000003 PHARM REV CODE 250

## 2024-01-03 PROCEDURE — 11000001 HC ACUTE MED/SURG PRIVATE ROOM

## 2024-01-03 PROCEDURE — 63600175 PHARM REV CODE 636 W HCPCS: Performed by: NURSE PRACTITIONER

## 2024-01-03 PROCEDURE — S4991 NICOTINE PATCH NONLEGEND: HCPCS | Performed by: NURSE PRACTITIONER

## 2024-01-03 RX ORDER — FUROSEMIDE 10 MG/ML
20 INJECTION INTRAMUSCULAR; INTRAVENOUS ONCE
Status: COMPLETED | OUTPATIENT
Start: 2024-01-03 | End: 2024-01-03

## 2024-01-03 RX ADMIN — METHOCARBAMOL 500 MG: 500 TABLET ORAL at 01:01

## 2024-01-03 RX ADMIN — DIVALPROEX SODIUM 500 MG: 500 TABLET, FILM COATED, EXTENDED RELEASE ORAL at 08:01

## 2024-01-03 RX ADMIN — FAMOTIDINE 20 MG: 20 TABLET ORAL at 08:01

## 2024-01-03 RX ADMIN — NICOTINE 1 PATCH: 14 PATCH TRANSDERMAL at 09:01

## 2024-01-03 RX ADMIN — VANCOMYCIN HYDROCHLORIDE 1250 MG: 1.25 INJECTION, POWDER, LYOPHILIZED, FOR SOLUTION INTRAVENOUS at 07:01

## 2024-01-03 RX ADMIN — POLYETHYLENE GLYCOL 3350 17 G: 17 POWDER, FOR SOLUTION ORAL at 09:01

## 2024-01-03 RX ADMIN — CEFEPIME 2 G: 2 INJECTION, POWDER, FOR SOLUTION INTRAVENOUS at 01:01

## 2024-01-03 RX ADMIN — SODIUM CHLORIDE: 9 INJECTION, SOLUTION INTRAVENOUS at 08:01

## 2024-01-03 RX ADMIN — BACITRACIN: 500 OINTMENT TOPICAL at 04:01

## 2024-01-03 RX ADMIN — ENOXAPARIN SODIUM 40 MG: 100 INJECTION SUBCUTANEOUS at 09:01

## 2024-01-03 RX ADMIN — CEFEPIME 2 G: 2 INJECTION, POWDER, FOR SOLUTION INTRAVENOUS at 02:01

## 2024-01-03 RX ADMIN — POLYETHYLENE GLYCOL 3350 17 G: 17 POWDER, FOR SOLUTION ORAL at 08:01

## 2024-01-03 RX ADMIN — DOCUSATE SODIUM 100 MG: 100 CAPSULE, LIQUID FILLED ORAL at 08:01

## 2024-01-03 RX ADMIN — DRONABINOL 2.5 MG: 2.5 CAPSULE ORAL at 09:01

## 2024-01-03 RX ADMIN — GABAPENTIN 400 MG: 400 CAPSULE ORAL at 04:01

## 2024-01-03 RX ADMIN — BACITRACIN: 500 OINTMENT TOPICAL at 08:01

## 2024-01-03 RX ADMIN — ENOXAPARIN SODIUM 40 MG: 100 INJECTION SUBCUTANEOUS at 08:01

## 2024-01-03 RX ADMIN — DIVALPROEX SODIUM 500 MG: 500 TABLET, FILM COATED, EXTENDED RELEASE ORAL at 09:01

## 2024-01-03 RX ADMIN — OXYCODONE HYDROCHLORIDE 5 MG: 5 TABLET ORAL at 07:01

## 2024-01-03 RX ADMIN — BACITRACIN: 500 OINTMENT TOPICAL at 09:01

## 2024-01-03 RX ADMIN — DRONABINOL 2.5 MG: 2.5 CAPSULE ORAL at 08:01

## 2024-01-03 RX ADMIN — FAMOTIDINE 20 MG: 20 TABLET ORAL at 09:01

## 2024-01-03 RX ADMIN — TRAZODONE HYDROCHLORIDE 50 MG: 50 TABLET ORAL at 08:01

## 2024-01-03 RX ADMIN — GABAPENTIN 400 MG: 400 CAPSULE ORAL at 11:01

## 2024-01-03 RX ADMIN — METHOCARBAMOL 500 MG: 500 TABLET ORAL at 09:01

## 2024-01-03 RX ADMIN — OXYCODONE HYDROCHLORIDE 5 MG: 5 TABLET ORAL at 08:01

## 2024-01-03 RX ADMIN — METHOCARBAMOL 500 MG: 500 TABLET ORAL at 08:01

## 2024-01-03 RX ADMIN — VANCOMYCIN HYDROCHLORIDE 1250 MG: 1.25 INJECTION, POWDER, LYOPHILIZED, FOR SOLUTION INTRAVENOUS at 04:01

## 2024-01-03 RX ADMIN — METHOCARBAMOL 500 MG: 500 TABLET ORAL at 04:01

## 2024-01-03 RX ADMIN — OXYCODONE HYDROCHLORIDE 5 MG: 5 TABLET ORAL at 01:01

## 2024-01-03 RX ADMIN — GABAPENTIN 400 MG: 400 CAPSULE ORAL at 09:01

## 2024-01-03 RX ADMIN — FUROSEMIDE 20 MG: 10 INJECTION, SOLUTION INTRAMUSCULAR; INTRAVENOUS at 07:01

## 2024-01-03 RX ADMIN — DOCUSATE SODIUM 100 MG: 100 CAPSULE, LIQUID FILLED ORAL at 09:01

## 2024-01-03 NOTE — PLAN OF CARE
Problem: Occupational Therapy  Goal: Occupational Therapy Goal  Description: Goals to be met by: 1/31/24     Patient will increase functional independence with ADLs by performing:    UE Dressing with Modified [place].  LE Dressing with Set-up Assistance.  Grooming while seated with Set-up Assistance.  Toileting from bedside commode with Stand-by Assistance for hygiene and clothing management.   Toilet transfer to bedside commode with Stand-by Assistance.    Outcome: Ongoing, Progressing
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
## 2024-01-03 NOTE — PLAN OF CARE
Spoke to pt and fmly members at bedside re: inpt acute rehab stay.  The fmly states they are open to a rehab in [facility] as they live in [place].  Referrals sent to [facility]# [phone] and [facility] ph# [phone].      Called both facilities to confirm receipt of referral info.  LVM with Intake Nurse Alayna [facility] and LVM with admissions [facility].  Requested return call if they did not receive.    Both facilities called and did not rec. Referral thru [employer] , faxed referral sent to Alayna with [facility] # [phone] and also to Chelo with [facility]. Fax# [phone].
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
## 2024-01-04 LAB
BACTERIA SPEC CULT: ABNORMAL
BACTERIA UR CULT: NO GROWTH
BACTERIA WND CULT: ABNORMAL
GRAM STN SPEC: ABNORMAL

## 2024-01-04 PROCEDURE — 25000003 PHARM REV CODE 250: Performed by: NURSE PRACTITIONER

## 2024-01-04 PROCEDURE — 25000003 PHARM REV CODE 250

## 2024-01-04 PROCEDURE — S4991 NICOTINE PATCH NONLEGEND: HCPCS | Performed by: NURSE PRACTITIONER

## 2024-01-04 PROCEDURE — 99024 POSTOP FOLLOW-UP VISIT: CPT | Mod: ,,, | Performed by: SURGERY

## 2024-01-04 PROCEDURE — 63600175 PHARM REV CODE 636 W HCPCS: Performed by: SURGERY

## 2024-01-04 PROCEDURE — 97535 SELF CARE MNGMENT TRAINING: CPT

## 2024-01-04 PROCEDURE — 63600175 PHARM REV CODE 636 W HCPCS

## 2024-01-04 PROCEDURE — 11000001 HC ACUTE MED/SURG PRIVATE ROOM

## 2024-01-04 PROCEDURE — 97530 THERAPEUTIC ACTIVITIES: CPT

## 2024-01-04 PROCEDURE — 63600175 PHARM REV CODE 636 W HCPCS: Performed by: NURSE PRACTITIONER

## 2024-01-04 RX ORDER — HYDROMORPHONE HYDROCHLORIDE 2 MG/ML
0.5 INJECTION, SOLUTION INTRAMUSCULAR; INTRAVENOUS; SUBCUTANEOUS DAILY PRN
Status: COMPLETED | OUTPATIENT
Start: 2024-01-04 | End: 2024-01-05

## 2024-01-04 RX ADMIN — NICOTINE 1 PATCH: 14 PATCH TRANSDERMAL at 09:01

## 2024-01-04 RX ADMIN — HYDROXYZINE PAMOATE 25 MG: 25 CAPSULE ORAL at 04:01

## 2024-01-04 RX ADMIN — ENOXAPARIN SODIUM 40 MG: 100 INJECTION SUBCUTANEOUS at 08:01

## 2024-01-04 RX ADMIN — DIVALPROEX SODIUM 500 MG: 500 TABLET, FILM COATED, EXTENDED RELEASE ORAL at 09:01

## 2024-01-04 RX ADMIN — AMPICILLIN SODIUM AND SULBACTAM SODIUM 3 G: 2; 1 INJECTION, POWDER, FOR SOLUTION INTRAMUSCULAR; INTRAVENOUS at 08:01

## 2024-01-04 RX ADMIN — OXYCODONE HYDROCHLORIDE 10 MG: 10 TABLET ORAL at 03:01

## 2024-01-04 RX ADMIN — METHOCARBAMOL 500 MG: 500 TABLET ORAL at 08:01

## 2024-01-04 RX ADMIN — OXYCODONE HYDROCHLORIDE 10 MG: 10 TABLET ORAL at 09:01

## 2024-01-04 RX ADMIN — DOCUSATE SODIUM 100 MG: 100 CAPSULE, LIQUID FILLED ORAL at 09:01

## 2024-01-04 RX ADMIN — DIVALPROEX SODIUM 500 MG: 500 TABLET, FILM COATED, EXTENDED RELEASE ORAL at 08:01

## 2024-01-04 RX ADMIN — TRAZODONE HYDROCHLORIDE 50 MG: 50 TABLET ORAL at 08:01

## 2024-01-04 RX ADMIN — BACITRACIN: 500 OINTMENT TOPICAL at 03:01

## 2024-01-04 RX ADMIN — ENOXAPARIN SODIUM 40 MG: 100 INJECTION SUBCUTANEOUS at 09:01

## 2024-01-04 RX ADMIN — METHOCARBAMOL 500 MG: 500 TABLET ORAL at 09:01

## 2024-01-04 RX ADMIN — DRONABINOL 2.5 MG: 2.5 CAPSULE ORAL at 08:01

## 2024-01-04 RX ADMIN — METHOCARBAMOL 500 MG: 500 TABLET ORAL at 01:01

## 2024-01-04 RX ADMIN — VANCOMYCIN HYDROCHLORIDE 1250 MG: 1.25 INJECTION, POWDER, LYOPHILIZED, FOR SOLUTION INTRAVENOUS at 06:01

## 2024-01-04 RX ADMIN — DRONABINOL 2.5 MG: 2.5 CAPSULE ORAL at 09:01

## 2024-01-04 RX ADMIN — GABAPENTIN 400 MG: 400 CAPSULE ORAL at 08:01

## 2024-01-04 RX ADMIN — OXYCODONE HYDROCHLORIDE 10 MG: 10 TABLET ORAL at 04:01

## 2024-01-04 RX ADMIN — BACITRACIN: 500 OINTMENT TOPICAL at 09:01

## 2024-01-04 RX ADMIN — GABAPENTIN 400 MG: 400 CAPSULE ORAL at 09:01

## 2024-01-04 RX ADMIN — METHOCARBAMOL 500 MG: 500 TABLET ORAL at 04:01

## 2024-01-04 RX ADMIN — HYDROMORPHONE HYDROCHLORIDE 0.2 MG: 2 INJECTION INTRAMUSCULAR; INTRAVENOUS; SUBCUTANEOUS at 10:01

## 2024-01-04 RX ADMIN — GABAPENTIN 400 MG: 400 CAPSULE ORAL at 03:01

## 2024-01-04 RX ADMIN — POLYETHYLENE GLYCOL 3350 17 G: 17 POWDER, FOR SOLUTION ORAL at 08:01

## 2024-01-04 RX ADMIN — DOCUSATE SODIUM 100 MG: 100 CAPSULE, LIQUID FILLED ORAL at 08:01

## 2024-01-04 RX ADMIN — AMPICILLIN SODIUM AND SULBACTAM SODIUM 3 G: 2; 1 INJECTION, POWDER, FOR SOLUTION INTRAMUSCULAR; INTRAVENOUS at 01:01

## 2024-01-04 RX ADMIN — POLYETHYLENE GLYCOL 3350 17 G: 17 POWDER, FOR SOLUTION ORAL at 09:01

## 2024-01-04 RX ADMIN — OXYCODONE HYDROCHLORIDE 10 MG: 10 TABLET ORAL at 08:01
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
## 2024-01-04 NOTE — PLAN OF CARE
Clinical updates faxed to [facility] (Attn Chelo) [phone] and to Kallie Lira (Attn Alayna)[phone].
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
## 2024-01-04 NOTE — NURSING
"Pt loss IV access, refuse to allow nursing staff to replace IV. Pt stated "You can do it in the morning." I educated the pt and family at bedside about the importance of continuing IV antibiotics as ordered.  "

## 2024-01-05 VITALS
SYSTOLIC BLOOD PRESSURE: 118 MMHG | HEART RATE: 98 BPM | RESPIRATION RATE: 18 BRPM | HEIGHT: 63 IN | WEIGHT: 150 LBS | BODY MASS INDEX: 26.58 KG/M2 | DIASTOLIC BLOOD PRESSURE: 84 MMHG | TEMPERATURE: 98 F | OXYGEN SATURATION: 96 %

## 2024-01-05 LAB
ABS NEUT (OLG): 16.08 X10(3)/MCL (ref 2.1–9.2)
ALBUMIN SERPL-MCNC: 1.8 G/DL (ref 3.5–5)
ALBUMIN/GLOB SERPL: 0.6 RATIO (ref 1.1–2)
ALP SERPL-CCNC: 118 UNIT/L (ref 40–150)
ALT SERPL-CCNC: 32 UNIT/L (ref 0–55)
AST SERPL-CCNC: 36 UNIT/L (ref 5–34)
BILIRUB SERPL-MCNC: 0.5 MG/DL
BUN SERPL-MCNC: 5.3 MG/DL (ref 7–18.7)
CALCIUM SERPL-MCNC: 7.6 MG/DL (ref 8.4–10.2)
CHLORIDE SERPL-SCNC: 107 MMOL/L (ref 98–107)
CO2 SERPL-SCNC: 24 MMOL/L (ref 22–29)
CREAT SERPL-MCNC: 0.45 MG/DL (ref 0.55–1.02)
ERYTHROCYTE [DISTWIDTH] IN BLOOD BY AUTOMATED COUNT: 16.9 % (ref 11.5–17)
GFR SERPLBLD CREATININE-BSD FMLA CKD-EPI: >60 MLS/MIN/1.73/M2
GLOBULIN SER-MCNC: 3.1 GM/DL (ref 2.4–3.5)
GLUCOSE SERPL-MCNC: 119 MG/DL (ref 74–100)
HCT VFR BLD AUTO: 24.2 % (ref 37–47)
HGB BLD-MCNC: 8.2 G/DL (ref 12–16)
INSTRUMENT WBC (OLG): 19.14 X10(3)/MCL
LYMPHOCYTES NFR BLD MANUAL: 11 %
LYMPHOCYTES NFR BLD MANUAL: 2.11 X10(3)/MCL
MCH RBC QN AUTO: 29.3 PG (ref 27–31)
MCHC RBC AUTO-ENTMCNC: 33.9 G/DL (ref 33–36)
MCV RBC AUTO: 86.4 FL (ref 80–94)
METAMYELOCYTES NFR BLD MANUAL: 1 %
MONOCYTES NFR BLD MANUAL: 0.57 X10(3)/MCL (ref 0.1–1.3)
MONOCYTES NFR BLD MANUAL: 3 %
MYELOCYTES NFR BLD MANUAL: 3 %
NEUTROPHILS NFR BLD MANUAL: 84 %
NRBC BLD AUTO-RTO: 0.3 %
NRBC BLD MANUAL-RTO: 1 %
PLATELET # BLD AUTO: 419 X10(3)/MCL (ref 130–400)
PLATELET # BLD EST: ABNORMAL 10*3/UL
PLATELETS.RETICULATED NFR BLD AUTO: 2.9 % (ref 0.9–11.2)
PMV BLD AUTO: 10 FL (ref 7.4–10.4)
POIKILOCYTOSIS BLD QL SMEAR: ABNORMAL
POTASSIUM SERPL-SCNC: 3.8 MMOL/L (ref 3.5–5.1)
PROT SERPL-MCNC: 4.9 GM/DL (ref 6.4–8.3)
RBC # BLD AUTO: 2.8 X10(6)/MCL (ref 4.2–5.4)
RBC MORPH BLD: ABNORMAL
SODIUM SERPL-SCNC: 138 MMOL/L (ref 136–145)
TARGETS BLD QL SMEAR: ABNORMAL
WBC # SPEC AUTO: 18.92 X10(3)/MCL (ref 4.5–11.5)

## 2024-01-05 PROCEDURE — C1751 CATH, INF, PER/CENT/MIDLINE: HCPCS

## 2024-01-05 PROCEDURE — 97535 SELF CARE MNGMENT TRAINING: CPT

## 2024-01-05 PROCEDURE — 85027 COMPLETE CBC AUTOMATED: CPT

## 2024-01-05 PROCEDURE — 99238 HOSP IP/OBS DSCHRG MGMT 30/<: CPT | Mod: 24,,, | Performed by: SURGERY

## 2024-01-05 PROCEDURE — 25000003 PHARM REV CODE 250: Performed by: NURSE PRACTITIONER

## 2024-01-05 PROCEDURE — 63600175 PHARM REV CODE 636 W HCPCS

## 2024-01-05 PROCEDURE — 63600175 PHARM REV CODE 636 W HCPCS: Performed by: SURGERY

## 2024-01-05 PROCEDURE — 25000003 PHARM REV CODE 250

## 2024-01-05 PROCEDURE — 02HV33Z INSERTION OF INFUSION DEVICE INTO SUPERIOR VENA CAVA, PERCUTANEOUS APPROACH: ICD-10-PCS | Performed by: SURGERY

## 2024-01-05 PROCEDURE — S4991 NICOTINE PATCH NONLEGEND: HCPCS | Performed by: NURSE PRACTITIONER

## 2024-01-05 PROCEDURE — 80053 COMPREHEN METABOLIC PANEL: CPT

## 2024-01-05 PROCEDURE — 36569 INSJ PICC 5 YR+ W/O IMAGING: CPT

## 2024-01-05 PROCEDURE — 97530 THERAPEUTIC ACTIVITIES: CPT

## 2024-01-05 PROCEDURE — 63600175 PHARM REV CODE 636 W HCPCS: Performed by: NURSE PRACTITIONER

## 2024-01-05 RX ORDER — SIMETHICONE 80 MG
80 TABLET,CHEWABLE ORAL 3 TIMES DAILY PRN
Refills: 0
Start: 2024-01-05

## 2024-01-05 RX ORDER — HEPARIN 100 UNIT/ML
5 SYRINGE INTRAVENOUS
Start: 2024-01-05

## 2024-01-05 RX ORDER — OXYCODONE HYDROCHLORIDE 5 MG/1
5 TABLET ORAL EVERY 4 HOURS PRN
Refills: 0
Start: 2024-01-05 | End: 2024-01-25

## 2024-01-05 RX ORDER — OXYCODONE HYDROCHLORIDE 10 MG/1
10 TABLET ORAL EVERY 4 HOURS PRN
Refills: 0
Start: 2024-01-05 | End: 2024-01-25

## 2024-01-05 RX ORDER — METHOCARBAMOL 500 MG/1
500 TABLET, FILM COATED ORAL 4 TIMES DAILY
Qty: 40 TABLET | Refills: 0
Start: 2024-01-05 | End: 2024-01-15

## 2024-01-05 RX ORDER — DOCUSATE SODIUM 100 MG/1
100 CAPSULE, LIQUID FILLED ORAL 2 TIMES DAILY
Refills: 0
Start: 2024-01-05

## 2024-01-05 RX ORDER — MICONAZOLE NITRATE 2 %
2 CREAM (GRAM) TOPICAL
Refills: 0
Start: 2024-01-05

## 2024-01-05 RX ORDER — POLYETHYLENE GLYCOL 3350 17 G/17G
17 POWDER, FOR SOLUTION ORAL 2 TIMES DAILY
Refills: 0
Start: 2024-01-05

## 2024-01-05 RX ORDER — LIDOCAINE 40 MG/G
CREAM TOPICAL 3 TIMES DAILY PRN
Refills: 0
Start: 2024-01-05

## 2024-01-05 RX ORDER — ENOXAPARIN SODIUM 100 MG/ML
40 INJECTION SUBCUTANEOUS EVERY 12 HOURS
Start: 2024-01-05

## 2024-01-05 RX ORDER — BACITRACIN 500 [USP'U]/G
OINTMENT TOPICAL 3 TIMES DAILY
Refills: 0
Start: 2024-01-05

## 2024-01-05 RX ORDER — DRONABINOL 2.5 MG/1
2.5 CAPSULE ORAL 2 TIMES DAILY
Start: 2024-01-05

## 2024-01-05 RX ORDER — LORAZEPAM 1 MG/1
1 TABLET ORAL EVERY 4 HOURS PRN
Start: 2024-01-05 | End: 2024-02-04

## 2024-01-05 RX ORDER — TALC
6 POWDER (GRAM) TOPICAL NIGHTLY PRN
Refills: 0
Start: 2024-01-05

## 2024-01-05 RX ORDER — SODIUM CHLORIDE 0.9 % (FLUSH) 0.9 %
10 SYRINGE (ML) INJECTION
Status: DISCONTINUED | OUTPATIENT
Start: 2024-01-05 | End: 2024-01-05 | Stop reason: HOSPADM

## 2024-01-05 RX ORDER — GABAPENTIN 400 MG/1
400 CAPSULE ORAL 3 TIMES DAILY
Qty: 90 CAPSULE | Refills: 11
Start: 2024-01-05 | End: 2025-01-04

## 2024-01-05 RX ORDER — IBUPROFEN 200 MG
1 TABLET ORAL DAILY
Refills: 0
Start: 2024-01-06

## 2024-01-05 RX ORDER — SODIUM CHLORIDE 0.9 % (FLUSH) 0.9 %
10 SYRINGE (ML) INJECTION EVERY 6 HOURS
Status: DISCONTINUED | OUTPATIENT
Start: 2024-01-05 | End: 2024-01-05 | Stop reason: HOSPADM

## 2024-01-05 RX ORDER — ADHESIVE BANDAGE
30 BANDAGE TOPICAL DAILY PRN
Start: 2024-01-05

## 2024-01-05 RX ADMIN — METHOCARBAMOL 500 MG: 500 TABLET ORAL at 01:01

## 2024-01-05 RX ADMIN — METHOCARBAMOL 500 MG: 500 TABLET ORAL at 09:01

## 2024-01-05 RX ADMIN — DOCUSATE SODIUM 100 MG: 100 CAPSULE, LIQUID FILLED ORAL at 09:01

## 2024-01-05 RX ADMIN — HYDROMORPHONE HYDROCHLORIDE 0.2 MG: 2 INJECTION INTRAMUSCULAR; INTRAVENOUS; SUBCUTANEOUS at 09:01

## 2024-01-05 RX ADMIN — ENOXAPARIN SODIUM 40 MG: 100 INJECTION SUBCUTANEOUS at 09:01

## 2024-01-05 RX ADMIN — HYDROMORPHONE HYDROCHLORIDE 0.5 MG: 2 INJECTION INTRAMUSCULAR; INTRAVENOUS; SUBCUTANEOUS at 01:01

## 2024-01-05 RX ADMIN — NICOTINE 1 PATCH: 14 PATCH TRANSDERMAL at 09:01

## 2024-01-05 RX ADMIN — OXYCODONE HYDROCHLORIDE 10 MG: 10 TABLET ORAL at 01:01

## 2024-01-05 RX ADMIN — DRONABINOL 2.5 MG: 2.5 CAPSULE ORAL at 09:01

## 2024-01-05 RX ADMIN — DIVALPROEX SODIUM 500 MG: 500 TABLET, FILM COATED, EXTENDED RELEASE ORAL at 09:01

## 2024-01-05 RX ADMIN — OXYCODONE HYDROCHLORIDE 10 MG: 10 TABLET ORAL at 06:01

## 2024-01-05 RX ADMIN — AMPICILLIN SODIUM AND SULBACTAM SODIUM 3 G: 2; 1 INJECTION, POWDER, FOR SOLUTION INTRAMUSCULAR; INTRAVENOUS at 01:01

## 2024-01-05 RX ADMIN — AMPICILLIN SODIUM AND SULBACTAM SODIUM 3 G: 2; 1 INJECTION, POWDER, FOR SOLUTION INTRAMUSCULAR; INTRAVENOUS at 04:01

## 2024-01-05 RX ADMIN — BACITRACIN: 500 OINTMENT TOPICAL at 09:01

## 2024-01-05 RX ADMIN — GABAPENTIN 400 MG: 400 CAPSULE ORAL at 09:01
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
## 2024-01-05 NOTE — PT/OT/SLP PROGRESS
Physical Therapy Treatment    Patient Name:  Sandra Hernandez   MRN:  [id number]    Recommendations:     Discharge therapy intensity: High Intensity Therapy   Discharge Equipment Recommendations: to be determined by next level of care  Barriers to discharge: Impaired mobility    Assessment:     Sandra Hernandez is a 23 y.o. female admitted with a medical diagnosis of Type I or II open fracture of right tibia and fibula.  She presents with the following impairments/functional limitations: weakness, pain, impaired cognition, decreased safety awareness, impaired self care skills, impaired functional mobility, decreased lower extremity function. The pt tolerated session poorly. Pt pre-medicated for PT session. The pt was able to sit EOB with moderate assistance and max encouragement. She attempted to stand x 3 trials, unable to clear bottom from bed, minimal effort put forward from pt to stand. Spoke with case management regarding discharge. Pt demonstrates poor sitting balance, requires support in sitting, would benefit from ambulance transfer. Progress as able.     Rehab Prognosis: Good; patient would benefit from acute skilled PT services to address these deficits and reach maximum level of function.    Recent Surgery: Procedure(s) (LRB):  INCISION AND DRAINAGE, LOWER EXTREMITY (Left) 3 Days Post-Op    Plan:     During this hospitalization, patient to be seen 6 x/week to address the identified rehab impairments via therapeutic activities, therapeutic exercises and progress toward the following goals:    Plan of Care Expires:  02/01/24    Subjective     Chief Complaint: pain  Patient/Family Comments/goals: return to PLOF  Pain/Comfort:  Location 1: leg  Location 2: groin      Objective:     Communicated with NSG prior to session.  Patient found supine with   upon PT entry to room.     General Precautions: Standard, fall  Orthopedic Precautions:  (WBAT BLE's for transfers only, no brace needed for back fx's)  Braces:  N/A  Respiratory Status: Room air  Blood Pressure: NA  Skin Integrity: Visible skin intact      Functional Mobility:  Bed Mobility:     Supine to sit: moderate assist  Sit to stand: unable to perform, attempted x 3  Sitting balance EOB: moderate assist, requires support 2/2 leaning to the R.      Education:  Patient provided with verbal education education regarding PT role/goals/POC.  Understanding was verbalized.     Patient left supine with all lines intact, call button in reach, and NSG notified..    GOALS:   Multidisciplinary Problems       Physical Therapy Goals          Problem: Physical Therapy    Goal Priority Disciplines Outcome Goal Variances Interventions   Physical Therapy Goal     PT, PT/OT Ongoing, Progressing     Description: Goals to be met by: 24     Patient will increase functional independence with mobility by performin. Supine to sit with Set-up Troy  2. Sit to supine with Set-up Troy  3. Rolling to Left and Right with Set-up Assistance.  4. Sit to stand transfer with Stand-by Assistance  5. Bed to chair transfer with Stand-by Assistance using Rolling Walker  6. Wheelchair propulsion x300 feet with Troy using bilateral uppper extremities                         Time Tracking:     PT Received On: 24  PT Start Time: 1017     PT Stop Time: 1040  PT Total Time (min): 23 min     Billable Minutes: Therapeutic Activity 23    Treatment Type: Treatment  PT/PTA: PT     Number of PTA visits since last PT visit: 4     2024

## 2024-01-05 NOTE — PLAN OF CARE
2617 Updated pt and her godmother Henna Saige who is present in room of [facility] to [facility] today. Both are in agreement of dc plan. PT set up with [facility] for transporation.  DC orders, Current mar, PICC note with post CXR faxed to [facility] in [place]. I spoke to Chelo GUTHRIE at [facility] and she gave info who to call report. DC packet with above info and disc with radiology images given placed in pt chart lynsey and notified Jona RIGGS of packet.  Spoke to Tita with [facility] to take pt out of [facility] and ready for transfer. She stated it should be within 1 hr currently. [employer] Ambulance transportation paper completed and placed on top of dc packet for [facility].

## 2024-01-05 NOTE — PLAN OF CARE
[facility] will accept today. Call to [facility] to discuss, PICC will be placed. Pt will dc w [facility]. [facility] confirmed they can do the IV ATB 4 weeks and silva.   [facility] will put in dc orders and pt will be in will call status. Once PICC is in and orders will fax to [facility] at

## 2024-01-05 NOTE — PROCEDURES
"Sandra Hernandez is a 23 y.o. female patient.    Temp: 98.1 °F (36.7 °C) (01/05/24 1159)  Pulse: 97 (01/05/24 1159)  Resp: 18 (01/05/24 1159)  BP: 116/78 (01/05/24 1159)  SpO2: 95 % (01/05/24 1159)  Weight: 68 kg (150 lb) (12/28/23 1749)  Height: 5' 3" (160 cm) (12/28/23 1749)    PICC  Time out: Immediately prior to procedure a time out was called to verify the correct patient, procedure, equipment, support staff and site/side marked as required  Indications: med administration  Preparation: skin prepped with ChloraPrep  Skin prep agent dried: skin prep agent completely dried prior to procedure  Sterile barriers: all five maximum sterile barriers used - cap, mask, sterile gown, sterile gloves, and large sterile sheet  Hand hygiene: hand hygiene performed prior to central venous catheter insertion  Location details: right brachial  Catheter type: double lumen  Catheter size: 5 Fr  Catheter Length: 33cm    Ultrasound guidance: yes  Needle advanced into vessel with real time Ultrasound guidance.  Guidewire confirmed in vessel.  Sterile sheath used.            Name sascha  1/5/2024    "

## 2024-01-05 NOTE — PT/OT/SLP PROGRESS
Occupational Therapy   Treatment    Name: Sandra Hernandez  MRN: [id number]  Admitting Diagnosis:  Type I or II open fracture of right tibia and fibula  3 Days Post-Op    Recommendations:     Recommended therapy intensity at discharge: High Intensity Therapy (pending tolerance for activity)   Discharge Equipment Recommendations:  wheelchair (other tbd)  Barriers to discharge:   (severity of injuries)    Assessment:     Sandra Hernandez is a 23 y.o. female with a medical diagnosis of Type I or II open fracture of right tibia and fibula.  She presents with cognitive issues limiting participation but did improve with mobility and able to get to EOB with max x 2 and cues throughout, sat EOB x ~5 min with max encouragement throughout, noted with decreased balance requiring min/mod assist to correct,  and pain in sitting, attempted standing x 3 reps and pt not making any effort to use LE's to assist, screaming intermittently that she's weak and in pain, can be re-directed briefly at times, educated throughout on plan of care and goals for therapy. Performance deficits affecting function are weakness, impaired endurance, impaired self care skills, impaired functional mobility, impaired cognition, decreased lower extremity function, decreased safety awareness, pain.     Rehab Prognosis:  Fair; patient would benefit from acute skilled OT services to address these deficits and reach maximum level of function.       Plan:     Patient to be seen 5 x/week to address the above listed problems via self-care/home management, therapeutic activities, therapeutic exercises  Plan of Care Expires: 01/31/24  Plan of Care Reviewed with: patient    Subjective     Pain/Comfort:  Pain Rating 1:  (no number, screaming intermittenly throughout that she's in pain)  Pain Addressed 1: Pre-medicate for activity, Distraction, Cessation of Activity, Reposition    Objective:     Communicated with: nsg and PT prior to session.  Patient found supine with telemetry,  peripheral IV, pressure relief boots, oxygen, ascencio catheter upon OT entry to room.    General Precautions: Standard, fall    Orthopedic Precautions: (WBAT BLE's for transfers only, no brace needed for back fx's)  Braces:    Respiratory Status:   Vital Signs:      Occupational Performance:     Bed Mobility:  sup to sit with max assist x 2, able to assist with UB with cues  Functional Mobility/Transfers:  EOB x ~5 min  Attempted standing x 3 reps but pt not attempting to assist with stands    Activities of Daily Living:  Total assist LB dressing      Therapeutic Activities:       Therapeutic Exercise:      Patient Education:  Patient provided with verbal education education regarding OT role/goals/POC and home exercise program .  Understanding was verbalized, however additional teaching warranted.      Patient left HOB up with all lines intact, call button in reach, and godmother present    GOALS:   Multidisciplinary Problems       Occupational Therapy Goals          Problem: Occupational Therapy    Goal Priority Disciplines Outcome Interventions   Occupational Therapy Goal     OT, PT/OT Ongoing, Progressing    Description: Goals to be met by: 1/31/24     Patient will increase functional independence with ADLs by performing:    UE Dressing with Modified [place].  LE Dressing with Set-up Assistance.  Grooming while seated with Set-up Assistance.  Toileting from bedside commode with Stand-by Assistance for hygiene and clothing management.   Toilet transfer to bedside commode with Stand-by Assistance.                         Time Tracking:     OT Date of Treatment: 01/05/24  OT Start Time: 1015  OT Stop Time: 1040  OT Total Time (min): 25 min    Billable Minutes:Self Care/Home Management 25 min    OT/AKILA: OT     Number of AKILA visits since last OT visit: 2    1/5/2024
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
## 2024-01-06 LAB
BACTERIA BLD CULT: NORMAL
BACTERIA SPEC ANAEROBE CULT: ABNORMAL
BACTERIA SPEC ANAEROBE CULT: ABNORMAL

## 2024-01-07 ENCOUNTER — DOCUMENTATION ONLY (OUTPATIENT)
Dept: SURGERY | Facility: HOSPITAL | Age: 24
End: 2024-01-07
Payer: MEDICAID

## 2024-01-07 LAB — BACTERIA BLD CULT: NORMAL

## 2024-01-07 NOTE — PROGRESS NOTES
Anaerobic cultures back.  They are reviewed with the attending physician.  Patient likely needs to have Flagyl added.  I called and spoke to Verónica the nurse taking care of the patient at the rehab.  She understands the patient needs an antibiotic added and we recommend Flagyl.  She request faxing of the culture report to their unit for their physician to review.  I have faxed report for continuation of care.  In addition I have provided our phone number for follow-up.  She understands with the patient was ready to be discharged she was to call our clinic and we will give her the next available follow up.  We are available any time for questions or concerns and that phone number was given as well.

## 2024-01-25 ENCOUNTER — OFFICE VISIT (OUTPATIENT)
Dept: ORTHOPEDICS | Facility: CLINIC | Age: 24
End: 2024-01-25
Payer: MEDICAID

## 2024-01-25 VITALS
SYSTOLIC BLOOD PRESSURE: 121 MMHG | HEIGHT: 63 IN | HEART RATE: 106 BPM | DIASTOLIC BLOOD PRESSURE: 82 MMHG | BODY MASS INDEX: 26.57 KG/M2

## 2024-01-25 DIAGNOSIS — S82.401B TYPE I OR II OPEN FRACTURE OF RIGHT TIBIA AND FIBULA, INITIAL ENCOUNTER: Primary | ICD-10-CM

## 2024-01-25 DIAGNOSIS — S32.120A CLOSED NONDISPLACED ZONE II FRACTURE OF SACRUM, INITIAL ENCOUNTER: ICD-10-CM

## 2024-01-25 DIAGNOSIS — S82.201B TYPE I OR II OPEN FRACTURE OF RIGHT TIBIA AND FIBULA, INITIAL ENCOUNTER: Primary | ICD-10-CM

## 2024-01-25 PROCEDURE — 3074F SYST BP LT 130 MM HG: CPT | Mod: CPTII,,, | Performed by: ORTHOPAEDIC SURGERY

## 2024-01-25 PROCEDURE — 3079F DIAST BP 80-89 MM HG: CPT | Mod: CPTII,,, | Performed by: ORTHOPAEDIC SURGERY

## 2024-01-25 PROCEDURE — 1160F RVW MEDS BY RX/DR IN RCRD: CPT | Mod: CPTII,,, | Performed by: ORTHOPAEDIC SURGERY

## 2024-01-25 PROCEDURE — 99024 POSTOP FOLLOW-UP VISIT: CPT | Mod: ,,, | Performed by: ORTHOPAEDIC SURGERY

## 2024-01-25 PROCEDURE — 1159F MED LIST DOCD IN RCRD: CPT | Mod: CPTII,,, | Performed by: ORTHOPAEDIC SURGERY

## 2024-01-25 RX ORDER — OXYCODONE HYDROCHLORIDE 5 MG/1
5 TABLET ORAL EVERY 6 HOURS PRN
Qty: 12 TABLET | Refills: 0 | Status: SHIPPED | OUTPATIENT
Start: 2024-01-25 | End: 2024-01-28

## 2024-01-25 NOTE — PROGRESS NOTES
Subjective:       Patient ID: Sandra Hernandez is a 23 y.o. female.    Chief Complaint   Patient presents with    Right Lower Leg - Post-op Evaluation     Post-op Rt SI screw IMN Rt tibia shaft Sx. 1/2/24-GL 4/1/24. Patient states she is having some pain on today. Numbness and tingling, stiffness and cramping. She is elevating.          Patient is here today for evaluation of right tibia as well as pelvic ring injury status post right transsacral trans iliac screws as well as intramedullary nailing of right tibia.  She reports pain that is controlled with medication.  She is currently receiving IV antibiotics due to an open wound to her perineum with a thigh abscess has been drained by the trauma service.  She currently has a Baker catheter in place.  She has been allowed to stand to transfer.        Review of Systems   Constitutional: Negative for chills, fever and malaise/fatigue.   HENT:  Negative for congestion and hearing loss.    Eyes:  Negative for visual disturbance.   Cardiovascular:  Negative for chest pain and syncope.   Respiratory:  Negative for cough and shortness of breath.    Hematologic/Lymphatic: Does not bruise/bleed easily.   Skin:  Negative for color change and suspicious lesions.   Musculoskeletal:  Negative for falls and neck pain.   Gastrointestinal:  Negative for abdominal pain, nausea and vomiting.   Genitourinary:  Negative for dysuria and hematuria.   Neurological:  Negative for numbness and sensory change.   Psychiatric/Behavioral:  Negative for altered mental status. The patient is not nervous/anxious.         Current Outpatient Medications on File Prior to Visit   Medication Sig Dispense Refill    bacitracin 500 unit/gram ointment Apply topically 3 (three) times daily.  0    docusate sodium (COLACE) 100 MG capsule Take 1 capsule (100 mg total) by mouth 2 (two) times daily.  0    droNABinol (MARINOL) 2.5 MG capsule Take 1 capsule (2.5 mg total) by mouth 2 (two) times daily.      enoxaparin  "(LOVENOX) 40 mg/0.4 mL Syrg Inject 0.4 mLs (40 mg total) into the skin every 12 (twelve) hours.      gabapentin (NEURONTIN) 400 MG capsule Take 1 capsule (400 mg total) by mouth 3 (three) times daily. 90 capsule 11    heparin, porcine, PF, (HEPARIN FLUSH 100 UNITS/ML) 100 unit/mL Syrg Inject 5 mLs (500 Units total) into the vein On call Procedure.      LIDOcaine (LMX) 4 % cream Apply topically 3 (three) times daily as needed (rectal/ perirectal pain).  0    LORazepam (ATIVAN) 1 MG tablet Take 1 tablet (1 mg total) by mouth every 4 (four) hours as needed for Anxiety.      magnesium hydroxide 400 mg/5 ml (MILK OF MAGNESIA) 400 mg/5 mL Susp Take 30 mLs (2,400 mg total) by mouth daily as needed.      melatonin (MELATIN) 3 mg tablet Take 2 tablets (6 mg total) by mouth nightly as needed for Insomnia.  0    nicotine (NICODERM CQ) 14 mg/24 hr Place 1 patch onto the skin once daily.  0    nicotine, polacrilex, (NICORETTE) 2 mg Gum Take 1 each (2 mg total) by mouth every hour as needed (smoking cessation).  0    oxyCODONE (ROXICODONE) 10 mg Tab immediate release tablet Take 1 tablet (10 mg total) by mouth every 4 (four) hours as needed for Pain.  0    oxyCODONE (ROXICODONE) 5 MG immediate release tablet Take 1 tablet (5 mg total) by mouth every 4 (four) hours as needed.  0    polyethylene glycol (GLYCOLAX) 17 gram PwPk Take 17 g by mouth 2 (two) times a day.  0    simethicone (MYLICON) 80 MG chewable tablet Take 1 tablet (80 mg total) by mouth 3 (three) times daily as needed for Flatulence.  0    sodium chloride 0.9 % PgBk 100 mL with ampicillin-sulbactam 3 gram SolR 3 g Inject 3 g into the vein every 8 (eight) hours.       No current facility-administered medications on file prior to visit.          Objective:      /82 (BP Location: Left arm, Patient Position: Sitting, BP Method: Medium (Automatic))   Pulse 106   Ht 5' 3" (1.6 m)   BMI 26.57 kg/m²   Physical Exam  Constitutional:       General: She is not in acute " distress.     Appearance: Normal appearance. She is not ill-appearing.   HENT:      Head: Normocephalic and atraumatic.      Nose: No congestion.   Eyes:      Extraocular Movements: Extraocular movements intact.   Cardiovascular:      Rate and Rhythm: Normal rate and regular rhythm.      Pulses: Normal pulses.   Pulmonary:      Effort: Pulmonary effort is normal.      Breath sounds: Normal breath sounds.   Abdominal:      General: There is no distension.      Palpations: Abdomen is soft.      Tenderness: There is no abdominal tenderness.   Musculoskeletal:      Comments: Right lower extremity:  Traumatic wound to the anterior aspect of the shin as well healed.  No painful or prominent hardware.  She tolerates passive range motion of the right knee without significant discomfort.  Distally she has good active range motion of the ankle and digits.  No calf swelling or tenderness, no signs of DVT.  No pain with passive range motion of the right hip.  No pain with internal external rotation.  Sutures and staples removed.   Skin:     General: Skin is warm and dry.   Neurological:      Mental Status: She is alert and oriented to person, place, and time. Mental status is at baseline.   Psychiatric:         Mood and Affect: Mood normal.         Behavior: Behavior normal.         Thought Content: Thought content normal.         Judgment: Judgment normal.        Body mass index is 26.57 kg/m².    Radiology:         Assessment:         1. Type I or II open fracture of right tibia and fibula, initial encounter        2. Closed nondisplaced zone II fracture of sacrum, initial encounter                Plan:       Her right tibia was repaired by my partner Dr. Nehemiah Marquez, I will be following up her imaging to monitor her progression of healing.  A right transsacral trans iliac screws were placed to stabilize her pelvic ring.  She can continue stand to transfers only, no ambulation at this point.  She will need to follow up in the  trauma clinic for evaluation of her left peroneal wound, thigh abscess as well as her retained Baker catheter.  She is currently on IV antibiotics per the Trauma Service.  I will see her back in 6 weeks for repeat x-rays of her pelvis and right tibia at which time we will allow her to begin ambulation she has good progression of healing.  Her mother understands and agrees with all that we have discussed today and all questions and concerns were addressed.      This note/OR report was created with the assistance of  voice recognition software or phone  dictation.  There may be transcription errors as a result of using this technology however minimal. Effort has been made to assure accuracy of transcription but any obvious errors or omissions should be clarified with the author of the document.       Ravin Ferreira MD  Orthopedic Trauma  [facility]      No follow-ups on file.    Type I or II open fracture of right tibia and fibula, initial encounter    Closed nondisplaced zone II fracture of sacrum, initial encounter              No orders of the defined types were placed in this encounter.      No future appointments.

## 2024-02-05 LAB — FUNGUS SPEC CULT: NORMAL

## 2024-02-06 ENCOUNTER — OFFICE VISIT (OUTPATIENT)
Dept: SURGERY | Facility: CLINIC | Age: 24
End: 2024-02-06
Payer: MEDICAID

## 2024-02-06 DIAGNOSIS — L02.416 ABSCESS OF LEFT THIGH: Primary | ICD-10-CM

## 2024-02-06 PROCEDURE — 99024 POSTOP FOLLOW-UP VISIT: CPT | Mod: ,,,

## 2024-02-06 RX ORDER — OXYCODONE AND ACETAMINOPHEN 5; 325 MG/1; MG/1
1 TABLET ORAL EVERY 8 HOURS PRN
Qty: 6 TABLET | Refills: 0 | Status: SHIPPED | OUTPATIENT
Start: 2024-02-06 | End: 2024-02-08

## 2024-02-06 NOTE — PROGRESS NOTES
S: Patient presents today for follow up of her left thigh wound. She denies any fevers and only minimal drainage from thigh wound.  She request PICC removal and ascencio cathter removal if able. Last antibiotic dose was Friday, 2/2/24. She is still receiving home health with therapy. She reports pain to BLE with movement in bed.     O: AAO x 3 in no acute distress. Penrose drain in place to left upper thigh. Small amount of serous drainage noted to site. No purulence noted or expressed from wound. Ascencio cathter in place draining yellow urine. Wound to right buttock near rectum is healing well. No signs of infection present. PICC line in place to RUE.     A: Left thigh complicated abscess, healing. Right buttock laceration, healing.    P: RUE PICC line removed. Catheter tip intact. No indication for further antibiotics at this time.  Left upper thigh penrose drain removed. See picture in chart. Dry dressing placed. Wound care instructions given to patient and family for daily dry dressing changes or as needed if soiled. Ascencio catheter removed without difficulty. Patient and family advised if unable to void will need to present to nearest ED. Will give 2 day refill of pain medication. Advised will need to transition off of narcotic pain medication to ibuprofen and continue gabapentin for nerve pain. Both patient and family verbalized understanding of instructions given.    Christina Syed, AGACN-BC, FNP-BC  Trauma Surgery  [facility]  C: [phone]     Nostril Rim Text: The closure involved the nostril rim.

## (undated) DEVICE — GLOVE PROTEXIS HYDROGEL SZ7.5

## (undated) DEVICE — GOWN SMARTSLEEVE AAMI LVL4 XL

## (undated) DEVICE — GAUZE SPONGE BULKEE 6X6.75IN

## (undated) DEVICE — PADDING CAST 6IN DELTA ROLL

## (undated) DEVICE — SUT ETHILON 3-0 FS-1 30

## (undated) DEVICE — DRAIN JP STD PENROSE 1/4X12IN

## (undated) DEVICE — SOL NACL IRR 1000ML BTL

## (undated) DEVICE — APPLICATOR CHLORAPREP ORN 26ML

## (undated) DEVICE — PADDING 4X4YD SPECIALIST100

## (undated) DEVICE — KIT SURGICAL TURNOVER

## (undated) DEVICE — DRAPE HAND STERILE

## (undated) DEVICE — SEE L#159833

## (undated) DEVICE — ELECTRODE REM POLYHESIVE II

## (undated) DEVICE — DRAPE ORTH SPLIT 77X108IN

## (undated) DEVICE — Device

## (undated) DEVICE — GLOVE PROTEXIS PI CRM 7

## (undated) DEVICE — COVER FULLGUARD SHOE HIGH-TOP

## (undated) DEVICE — COVER TABLE HVY DTY 60X90IN

## (undated) DEVICE — GLOVE PROTEXIS LTX MICRO 8

## (undated) DEVICE — DRAPE LEGGINGS CUFF 31X48IN

## (undated) DEVICE — STAPLER SKIN PROXIMATE WIDE

## (undated) DEVICE — DRESSING XEROFORM 1X8IN

## (undated) DEVICE — TAPE SILK 3IN

## (undated) DEVICE — ELECTRODE PATIENT RETURN DISP

## (undated) DEVICE — BIT DRILL XLN 4.2MM

## (undated) DEVICE — BANDAGE ESMARK 4INX3YD

## (undated) DEVICE — PAD CAST 6X4YD SPECIALISTIC

## (undated) DEVICE — WIRE GUIDE 3.2MM 400MM
Type: IMPLANTABLE DEVICE | Site: TIBIA | Status: NON-FUNCTIONAL
Removed: 2023-12-28

## (undated) DEVICE — TRAY SKIN SCRUB WET PREMIUM

## (undated) DEVICE — SLEEVE OTR PROTCT STRL 12MM

## (undated) DEVICE — PANTIES FEMININE NAPKIN LG/XLG

## (undated) DEVICE — BANDAGE ACE ELASTIC 6"

## (undated) DEVICE — DRESSING GAUZE 6PLY 4X4

## (undated) DEVICE — GLOVE PROTEXIS BLUE LATEX 7.5

## (undated) DEVICE — DRAPE C-ARMOR EQUIPMENT COVER

## (undated) DEVICE — PADDING CAST 4IN DELTA ROLL

## (undated) DEVICE — SUT VICRYL BR 1 GEN 27 CT-1

## (undated) DEVICE — DRAPE STERI U-SHAPED 47X51IN

## (undated) DEVICE — GLOVE 8 PROTEXIS PI ORTHO

## (undated) DEVICE — DRESSING ANTIMIC ISLAND 4X10IN

## (undated) DEVICE — BIT DRILL 4.2MM 3 FLUTD 145MM

## (undated) DEVICE — DRESSING XEROFORM 5X9IN

## (undated) DEVICE — GAUZE SPONGE 4X4 12PLY